# Patient Record
Sex: FEMALE | Race: BLACK OR AFRICAN AMERICAN | NOT HISPANIC OR LATINO | Employment: FULL TIME | RURAL
[De-identification: names, ages, dates, MRNs, and addresses within clinical notes are randomized per-mention and may not be internally consistent; named-entity substitution may affect disease eponyms.]

---

## 2021-08-19 ENCOUNTER — HOSPITAL ENCOUNTER (EMERGENCY)
Facility: HOSPITAL | Age: 41
Discharge: HOME | End: 2021-08-19
Attending: FAMILY MEDICINE
Payer: MEDICAID

## 2021-08-19 VITALS
HEIGHT: 64 IN | SYSTOLIC BLOOD PRESSURE: 146 MMHG | OXYGEN SATURATION: 95 % | RESPIRATION RATE: 19 BRPM | BODY MASS INDEX: 35.68 KG/M2 | DIASTOLIC BLOOD PRESSURE: 78 MMHG | HEART RATE: 125 BPM | WEIGHT: 209 LBS | TEMPERATURE: 103 F

## 2021-08-19 DIAGNOSIS — N30.00 ACUTE CYSTITIS WITHOUT HEMATURIA: ICD-10-CM

## 2021-08-19 DIAGNOSIS — U07.1 COVID-19: Primary | ICD-10-CM

## 2021-08-19 DIAGNOSIS — R50.9 FEVER, UNSPECIFIED FEVER CAUSE: ICD-10-CM

## 2021-08-19 DIAGNOSIS — R00.0 TACHYCARDIA: ICD-10-CM

## 2021-08-19 LAB
ALBUMIN SERPL BCP-MCNC: 2.6 G/DL (ref 3.5–5)
ALBUMIN/GLOB SERPL: 0.5 {RATIO}
ALP SERPL-CCNC: 68 U/L (ref 37–98)
ALT SERPL W P-5'-P-CCNC: 21 U/L (ref 13–56)
ANION GAP SERPL CALCULATED.3IONS-SCNC: 13 MMOL/L (ref 7–16)
AST SERPL W P-5'-P-CCNC: 18 U/L (ref 15–37)
BACTERIA #/AREA URNS HPF: ABNORMAL /HPF
BASOPHILS # BLD AUTO: 0.01 K/UL (ref 0–0.2)
BASOPHILS NFR BLD AUTO: 0.1 % (ref 0–1)
BILIRUB SERPL-MCNC: 0.2 MG/DL (ref 0–1.2)
BILIRUB UR QL STRIP: NEGATIVE
BUN SERPL-MCNC: 10 MG/DL (ref 7–18)
BUN/CREAT SERPL: 13 (ref 6–20)
CALCIUM SERPL-MCNC: 8.4 MG/DL (ref 8.5–10.1)
CHLORIDE SERPL-SCNC: 98 MMOL/L (ref 98–107)
CLARITY UR: ABNORMAL
CO2 SERPL-SCNC: 26 MMOL/L (ref 21–32)
COLOR UR: ABNORMAL
CREAT SERPL-MCNC: 0.8 MG/DL (ref 0.55–1.02)
DIFFERENTIAL METHOD BLD: ABNORMAL
EOSINOPHIL # BLD AUTO: 0 K/UL (ref 0–0.5)
EOSINOPHIL NFR BLD AUTO: 0 % (ref 1–4)
ERYTHROCYTE [DISTWIDTH] IN BLOOD BY AUTOMATED COUNT: 13.9 % (ref 11.5–14.5)
FLUAV AG UPPER RESP QL IA.RAPID: NEGATIVE
FLUBV AG UPPER RESP QL IA.RAPID: NEGATIVE
GLOBULIN SER-MCNC: 5.1 G/DL (ref 2–4)
GLUCOSE SERPL-MCNC: 124 MG/DL (ref 70–105)
GLUCOSE SERPL-MCNC: 131 MG/DL (ref 74–106)
GLUCOSE UR STRIP-MCNC: NEGATIVE MG/DL
HCT VFR BLD AUTO: 44 % (ref 38–47)
HGB BLD-MCNC: 13.7 G/DL (ref 12–16)
IMM GRANULOCYTES # BLD AUTO: 0.05 K/UL (ref 0–0.04)
IMM GRANULOCYTES NFR BLD: 0.5 % (ref 0–0.4)
KETONES UR STRIP-SCNC: NEGATIVE MG/DL
LEUKOCYTE ESTERASE UR QL STRIP: NEGATIVE
LYMPHOCYTES # BLD AUTO: 1.56 K/UL (ref 1–4.8)
LYMPHOCYTES NFR BLD AUTO: 16.4 % (ref 27–41)
MCH RBC QN AUTO: 25.1 PG (ref 27–31)
MCHC RBC AUTO-ENTMCNC: 31.1 G/DL (ref 32–36)
MCV RBC AUTO: 80.6 FL (ref 80–96)
MONOCYTES # BLD AUTO: 0.46 K/UL (ref 0–0.8)
MONOCYTES NFR BLD AUTO: 4.8 % (ref 2–6)
MPC BLD CALC-MCNC: 10.2 FL (ref 9.4–12.4)
MUCOUS THREADS #/AREA URNS HPF: ABNORMAL /HPF
NEUTROPHILS # BLD AUTO: 7.42 K/UL (ref 1.8–7.7)
NEUTROPHILS NFR BLD AUTO: 78.2 % (ref 53–65)
NITRITE UR QL STRIP: POSITIVE
NRBC # BLD AUTO: 0 X10E3/UL
NRBC, AUTO (.00): 0 %
PH UR STRIP: 5.5 PH UNITS
PLATELET # BLD AUTO: 267 K/UL (ref 150–400)
POTASSIUM SERPL-SCNC: 3.2 MMOL/L (ref 3.5–5.1)
PROT SERPL-MCNC: 7.7 G/DL (ref 6.4–8.2)
PROT UR QL STRIP: 100
RBC # BLD AUTO: 5.46 M/UL (ref 4.2–5.4)
RBC # UR STRIP: ABNORMAL /UL
RBC #/AREA URNS HPF: ABNORMAL /HPF
SARS-COV+SARS-COV-2 AG RESP QL IA.RAPID: POSITIVE
SODIUM SERPL-SCNC: 134 MMOL/L (ref 136–145)
SP GR UR STRIP: 1.02
SQUAMOUS #/AREA URNS LPF: ABNORMAL /LPF
TRICHOMONAS #/AREA URNS HPF: ABNORMAL /HPF
UROBILINOGEN UR STRIP-ACNC: 0.2 MG/DL
WBC # BLD AUTO: 9.5 K/UL (ref 4.5–11)
WBC #/AREA URNS HPF: ABNORMAL /HPF
YEAST #/AREA URNS HPF: ABNORMAL /HPF

## 2021-08-19 PROCEDURE — 99283 EMERGENCY DEPT VISIT LOW MDM: CPT | Mod: ,,, | Performed by: NURSE PRACTITIONER

## 2021-08-19 PROCEDURE — 36415 COLL VENOUS BLD VENIPUNCTURE: CPT | Performed by: NURSE PRACTITIONER

## 2021-08-19 PROCEDURE — 81003 URINALYSIS AUTO W/O SCOPE: CPT | Performed by: NURSE PRACTITIONER

## 2021-08-19 PROCEDURE — 85025 COMPLETE CBC W/AUTO DIFF WBC: CPT | Performed by: NURSE PRACTITIONER

## 2021-08-19 PROCEDURE — M0243 CASIRIVI AND IMDEVI INFUSION: HCPCS | Performed by: NURSE PRACTITIONER

## 2021-08-19 PROCEDURE — 87186 SC STD MICRODIL/AGAR DIL: CPT | Performed by: NURSE PRACTITIONER

## 2021-08-19 PROCEDURE — 25000003 PHARM REV CODE 250: Performed by: NURSE PRACTITIONER

## 2021-08-19 PROCEDURE — 96360 HYDRATION IV INFUSION INIT: CPT

## 2021-08-19 PROCEDURE — 87428 SARSCOV & INF VIR A&B AG IA: CPT | Performed by: NURSE PRACTITIONER

## 2021-08-19 PROCEDURE — 63600175 PHARM REV CODE 636 W HCPCS: Performed by: NURSE PRACTITIONER

## 2021-08-19 PROCEDURE — 81001 URINALYSIS AUTO W/SCOPE: CPT | Performed by: NURSE PRACTITIONER

## 2021-08-19 PROCEDURE — 96361 HYDRATE IV INFUSION ADD-ON: CPT

## 2021-08-19 PROCEDURE — 80053 COMPREHEN METABOLIC PANEL: CPT | Performed by: NURSE PRACTITIONER

## 2021-08-19 PROCEDURE — 99283 PR EMERGENCY DEPT VISIT,LEVEL III: ICD-10-PCS | Mod: ,,, | Performed by: NURSE PRACTITIONER

## 2021-08-19 PROCEDURE — 82962 GLUCOSE BLOOD TEST: CPT

## 2021-08-19 PROCEDURE — 87077 CULTURE AEROBIC IDENTIFY: CPT | Performed by: NURSE PRACTITIONER

## 2021-08-19 PROCEDURE — 99284 EMERGENCY DEPT VISIT MOD MDM: CPT | Mod: 25

## 2021-08-19 RX ORDER — POTASSIUM CHLORIDE 20 MEQ/1
40 TABLET, EXTENDED RELEASE ORAL ONCE
Status: COMPLETED | OUTPATIENT
Start: 2021-08-19 | End: 2021-08-19

## 2021-08-19 RX ORDER — CEPHALEXIN 500 MG/1
500 CAPSULE ORAL 4 TIMES DAILY
Qty: 20 CAPSULE | Refills: 0 | Status: SHIPPED | OUTPATIENT
Start: 2021-08-19 | End: 2021-08-24

## 2021-08-19 RX ORDER — ACETAMINOPHEN 500 MG
1000 TABLET ORAL
Status: COMPLETED | OUTPATIENT
Start: 2021-08-19 | End: 2021-08-19

## 2021-08-19 RX ORDER — INSULIN GLARGINE 100 [IU]/ML
INJECTION, SOLUTION SUBCUTANEOUS NIGHTLY
COMMUNITY

## 2021-08-19 RX ORDER — LISINOPRIL 10 MG/1
10 TABLET ORAL DAILY
COMMUNITY

## 2021-08-19 RX ADMIN — POTASSIUM CHLORIDE 40 MEQ: 1500 TABLET, EXTENDED RELEASE ORAL at 07:08

## 2021-08-19 RX ADMIN — SODIUM CHLORIDE 1000 ML: 9 INJECTION, SOLUTION INTRAVENOUS at 05:08

## 2021-08-19 RX ADMIN — CASIRIVIMAB AND IMDEVIMAB 600 MG: 600; 600 INJECTION, SOLUTION, CONCENTRATE INTRAVENOUS at 06:08

## 2021-08-19 RX ADMIN — ACETAMINOPHEN 1000 MG: 500 TABLET ORAL at 09:08

## 2021-08-22 LAB — UA COMPLETE W REFLEX CULTURE PNL UR: ABNORMAL

## 2022-08-19 ENCOUNTER — HOSPITAL ENCOUNTER (EMERGENCY)
Facility: HOSPITAL | Age: 42
Discharge: HOME | End: 2022-08-19
Payer: MEDICAID

## 2022-08-19 VITALS
BODY MASS INDEX: 35.85 KG/M2 | TEMPERATURE: 98 F | HEART RATE: 94 BPM | OXYGEN SATURATION: 97 % | WEIGHT: 210 LBS | RESPIRATION RATE: 20 BRPM | SYSTOLIC BLOOD PRESSURE: 171 MMHG | HEIGHT: 64 IN | DIASTOLIC BLOOD PRESSURE: 81 MMHG

## 2022-08-19 DIAGNOSIS — T50.Z95A ADVERSE EFFECT OF VACCINE, INITIAL ENCOUNTER: Primary | ICD-10-CM

## 2022-08-19 LAB
FLUAV AG UPPER RESP QL IA.RAPID: NEGATIVE
FLUBV AG UPPER RESP QL IA.RAPID: NEGATIVE
SARS-COV+SARS-COV-2 AG RESP QL IA.RAPID: NEGATIVE

## 2022-08-19 PROCEDURE — 99283 PR EMERGENCY DEPT VISIT,LEVEL III: ICD-10-PCS | Mod: ,,, | Performed by: NURSE PRACTITIONER

## 2022-08-19 PROCEDURE — 99283 EMERGENCY DEPT VISIT LOW MDM: CPT

## 2022-08-19 PROCEDURE — 99283 EMERGENCY DEPT VISIT LOW MDM: CPT | Mod: ,,, | Performed by: NURSE PRACTITIONER

## 2022-08-19 PROCEDURE — 87428 SARSCOV & INF VIR A&B AG IA: CPT | Performed by: NURSE PRACTITIONER

## 2022-08-19 NOTE — DISCHARGE INSTRUCTIONS
Rotate tylenol and ibuprofen as needed for fever. Follow up with your primary care provider in 2 days. Return to the emergency department for any increase in symptoms or for any other new or worrisome symptoms.

## 2022-08-19 NOTE — Clinical Note
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  She may return to work on 08/21/2022.       If you have any questions or concerns, please don't hesitate to call.      katherine banuelos RN    "

## 2022-08-19 NOTE — ED PROVIDER NOTES
Encounter Date: 2022       History     Chief Complaint   Patient presents with    Fever    Sore Throat     41 year old female presents to the emergency department to be evaluated for ear ache, sore throat and fever that began 3 days ago. She received a flu shot 4 days ago.     The history is provided by the patient.   Fever  Primary symptoms of the febrile illness include fever and fatigue. Primary symptoms do not include visual change, headaches, cough, wheezing, shortness of breath, abdominal pain, nausea, vomiting, diarrhea, dysuria, altered mental status, myalgias, arthralgias or rash.   The maximum temperature recorded prior to her arrival was 102 to 102.9 F.   Sore Throat   Pertinent negatives include no abdominal pain, coughing, diarrhea, headaches, shortness of breath or vomiting.     Review of patient's allergies indicates:  No Known Allergies  Past Medical History:   Diagnosis Date    Diabetes mellitus      Past Surgical History:   Procedure Laterality Date     SECTION      CHOLECYSTECTOMY       No family history on file.  Social History     Tobacco Use    Smoking status: Never Smoker    Smokeless tobacco: Never Used   Substance Use Topics    Alcohol use: Not Currently    Drug use: Never     Review of Systems   Constitutional: Positive for fatigue and fever.   HENT: Positive for sore throat.    Respiratory: Negative for cough, shortness of breath and wheezing.    Gastrointestinal: Negative for abdominal pain, diarrhea, nausea and vomiting.   Genitourinary: Negative for dysuria.   Musculoskeletal: Negative for arthralgias and myalgias.   Skin: Negative for rash.   Neurological: Negative for headaches.   All other systems reviewed and are negative.      Physical Exam     Initial Vitals [22 1212]   BP Pulse Resp Temp SpO2   (!) 171/81 94 20 98.3 °F (36.8 °C) 97 %      MAP       --         Physical Exam    Vitals reviewed.  Constitutional: She appears well-developed and  well-nourished.   HENT:   Right Ear: Tympanic membrane normal.   Left Ear: Tympanic membrane normal.   Mouth/Throat: Oropharynx is clear and moist.   Neck: Neck supple.   Cardiovascular: Normal rate and regular rhythm.   Pulmonary/Chest: Breath sounds normal.   Abdominal: Abdomen is soft. Bowel sounds are normal. She exhibits no distension and no mass. There is no abdominal tenderness. There is no rebound and no guarding.   Musculoskeletal:         General: Normal range of motion.      Cervical back: Neck supple.     Neurological: She is alert and oriented to person, place, and time. She has normal strength. GCS score is 15. GCS eye subscore is 4. GCS verbal subscore is 5. GCS motor subscore is 6.   Skin: Skin is warm and dry. Capillary refill takes less than 2 seconds.   Psychiatric: She has a normal mood and affect.         Medical Screening Exam   See Full Note    ED Course   Procedures  Labs Reviewed   SARS-COV2 (COVID) W/ FLU ANTIGEN - Normal    Narrative:     Negative SARS-CoV results should not be used as the sole basis for treatment or patient management decisions; negative results should be considered in the context of a patient's recent exposures, history and the presene of clinical signs and symptoms consistent with COVID-19.  Negative results should be treated as presumptive and confirmed by molecular assay, if necessary for patient management.          Imaging Results    None          Medications - No data to display                    Clinical Impression:   Final diagnoses:  [T50.Z95A] Adverse effect of vaccine, initial encounter (Primary)          ED Disposition Condition    Discharge Stable        ED Prescriptions     None        Follow-up Information    None          Yuliya Rose, MALINDA  08/19/22 [id number]

## 2024-04-24 ENCOUNTER — OFFICE VISIT (OUTPATIENT)
Dept: FAMILY MEDICINE | Facility: CLINIC | Age: 44
End: 2024-04-24
Payer: COMMERCIAL

## 2024-04-24 VITALS
SYSTOLIC BLOOD PRESSURE: 174 MMHG | HEIGHT: 64 IN | HEART RATE: 100 BPM | TEMPERATURE: 97 F | RESPIRATION RATE: 18 BRPM | WEIGHT: 215 LBS | DIASTOLIC BLOOD PRESSURE: 93 MMHG | OXYGEN SATURATION: 98 % | BODY MASS INDEX: 36.7 KG/M2

## 2024-04-24 DIAGNOSIS — E11.9 TYPE 2 DIABETES MELLITUS WITHOUT COMPLICATION, UNSPECIFIED WHETHER LONG TERM INSULIN USE: Primary | ICD-10-CM

## 2024-04-24 DIAGNOSIS — Z12.39 ENCOUNTER FOR SCREENING FOR MALIGNANT NEOPLASM OF BREAST, UNSPECIFIED SCREENING MODALITY: ICD-10-CM

## 2024-04-24 DIAGNOSIS — J30.9 ALLERGIC RHINITIS, UNSPECIFIED SEASONALITY, UNSPECIFIED TRIGGER: ICD-10-CM

## 2024-04-24 DIAGNOSIS — I10 HYPERTENSION, UNSPECIFIED TYPE: ICD-10-CM

## 2024-04-24 DIAGNOSIS — Z12.4 PAP SMEAR FOR CERVICAL CANCER SCREENING: ICD-10-CM

## 2024-04-24 LAB
ANION GAP SERPL CALCULATED.3IONS-SCNC: 10 MMOL/L (ref 7–16)
BUN SERPL-MCNC: 14 MG/DL (ref 7–18)
BUN/CREAT SERPL: 18 (ref 6–20)
CALCIUM SERPL-MCNC: 9.3 MG/DL (ref 8.5–10.1)
CHLORIDE SERPL-SCNC: 106 MMOL/L (ref 98–107)
CHOLEST SERPL-MCNC: 196 MG/DL (ref 0–200)
CHOLEST/HDLC SERPL: 4.8 {RATIO}
CO2 SERPL-SCNC: 25 MMOL/L (ref 21–32)
CREAT SERPL-MCNC: 0.8 MG/DL (ref 0.55–1.02)
EGFR (NO RACE VARIABLE) (RUSH/TITUS): 93 ML/MIN/1.73M2
EST. AVERAGE GLUCOSE BLD GHB EST-MCNC: 332 MG/DL
GLUCOSE SERPL-MCNC: 384 MG/DL (ref 74–106)
HBA1C MFR BLD HPLC: 13.2 % (ref 4.5–6.6)
HDLC SERPL-MCNC: 41 MG/DL (ref 40–60)
LDLC SERPL CALC-MCNC: 131 MG/DL
LDLC/HDLC SERPL: 3.2 {RATIO}
NONHDLC SERPL-MCNC: 155 MG/DL
POTASSIUM SERPL-SCNC: 4.1 MMOL/L (ref 3.5–5.1)
SODIUM SERPL-SCNC: 137 MMOL/L (ref 136–145)
TRIGL SERPL-MCNC: 119 MG/DL (ref 35–150)
VLDLC SERPL-MCNC: 24 MG/DL

## 2024-04-24 PROCEDURE — 99214 OFFICE O/P EST MOD 30 MIN: CPT | Mod: ,,, | Performed by: INTERNAL MEDICINE

## 2024-04-24 PROCEDURE — 80048 BASIC METABOLIC PNL TOTAL CA: CPT | Mod: ,,, | Performed by: CLINICAL MEDICAL LABORATORY

## 2024-04-24 PROCEDURE — 1159F MED LIST DOCD IN RCRD: CPT | Mod: ,,, | Performed by: INTERNAL MEDICINE

## 2024-04-24 PROCEDURE — 80061 LIPID PANEL: CPT | Mod: ,,, | Performed by: CLINICAL MEDICAL LABORATORY

## 2024-04-24 PROCEDURE — 83036 HEMOGLOBIN GLYCOSYLATED A1C: CPT | Mod: ,,, | Performed by: CLINICAL MEDICAL LABORATORY

## 2024-04-24 PROCEDURE — 3080F DIAST BP >= 90 MM HG: CPT | Mod: ,,, | Performed by: INTERNAL MEDICINE

## 2024-04-24 PROCEDURE — 3077F SYST BP >= 140 MM HG: CPT | Mod: ,,, | Performed by: INTERNAL MEDICINE

## 2024-04-24 PROCEDURE — 4010F ACE/ARB THERAPY RXD/TAKEN: CPT | Mod: ,,, | Performed by: INTERNAL MEDICINE

## 2024-04-24 PROCEDURE — 3008F BODY MASS INDEX DOCD: CPT | Mod: ,,, | Performed by: INTERNAL MEDICINE

## 2024-04-24 PROCEDURE — 3046F HEMOGLOBIN A1C LEVEL >9.0%: CPT | Mod: ,,, | Performed by: INTERNAL MEDICINE

## 2024-04-24 RX ORDER — ONDANSETRON 4 MG/1
4 TABLET, FILM COATED ORAL
COMMUNITY
Start: 2024-02-02 | End: 2024-04-24 | Stop reason: SDUPTHER

## 2024-04-24 RX ORDER — METFORMIN HYDROCHLORIDE 1000 MG/1
1000 TABLET ORAL 2 TIMES DAILY
Qty: 90 TABLET | Refills: 1 | Status: SHIPPED | OUTPATIENT
Start: 2024-04-24

## 2024-04-24 RX ORDER — BLOOD SUGAR DIAGNOSTIC
STRIP MISCELLANEOUS
COMMUNITY
Start: 2024-01-10 | End: 2024-04-24 | Stop reason: SDUPTHER

## 2024-04-24 RX ORDER — INSULIN GLARGINE 100 [IU]/ML
100 INJECTION, SOLUTION SUBCUTANEOUS NIGHTLY
Qty: 10 ML | Refills: 2 | Status: SHIPPED | OUTPATIENT
Start: 2024-04-24

## 2024-04-24 RX ORDER — METFORMIN HYDROCHLORIDE 1000 MG/1
1000 TABLET ORAL 2 TIMES DAILY
COMMUNITY
Start: 2024-01-10 | End: 2024-04-24 | Stop reason: SDUPTHER

## 2024-04-24 RX ORDER — SULFAMETHOXAZOLE AND TRIMETHOPRIM 800; 160 MG/1; MG/1
1 TABLET ORAL 2 TIMES DAILY
Qty: 20 TABLET | Refills: 0 | Status: SHIPPED | OUTPATIENT
Start: 2024-04-24

## 2024-04-24 RX ORDER — BLOOD SUGAR DIAGNOSTIC
1 STRIP MISCELLANEOUS 2 TIMES DAILY
Qty: 200 EACH | Refills: 2 | Status: SHIPPED | OUTPATIENT
Start: 2024-04-24

## 2024-04-24 RX ORDER — LISINOPRIL 20 MG/1
20 TABLET ORAL DAILY
Qty: 90 TABLET | Refills: 1 | Status: SHIPPED | OUTPATIENT
Start: 2024-04-24

## 2024-04-24 RX ORDER — ONDANSETRON 4 MG/1
4 TABLET, FILM COATED ORAL DAILY PRN
Qty: 30 TABLET | Refills: 0 | Status: SHIPPED | OUTPATIENT
Start: 2024-04-24

## 2024-04-24 RX ORDER — BLOOD-GLUCOSE METER
EACH MISCELLANEOUS
COMMUNITY
Start: 2024-01-10

## 2024-04-24 RX ORDER — LISINOPRIL 10 MG/1
10 TABLET ORAL DAILY
Status: CANCELLED | OUTPATIENT
Start: 2024-04-24

## 2024-04-24 RX ORDER — FLUCONAZOLE 200 MG/1
200 TABLET ORAL DAILY
Qty: 5 TABLET | Refills: 2 | Status: SHIPPED | OUTPATIENT
Start: 2024-04-24

## 2024-04-25 ENCOUNTER — TELEPHONE (OUTPATIENT)
Dept: FAMILY MEDICINE | Facility: CLINIC | Age: 44
End: 2024-04-25
Payer: COMMERCIAL

## 2024-04-25 DIAGNOSIS — Z12.11 COLON CANCER SCREENING: Primary | ICD-10-CM

## 2024-04-25 RX ORDER — POLYETHYLENE GLYCOL 3350, SODIUM SULFATE ANHYDROUS, SODIUM BICARBONATE, SODIUM CHLORIDE, POTASSIUM CHLORIDE 236; 22.74; 6.74; 5.86; 2.97 G/4L; G/4L; G/4L; G/4L; G/4L
4 POWDER, FOR SOLUTION ORAL ONCE
Qty: 4000 ML | Refills: 0 | Status: SHIPPED | OUTPATIENT
Start: 2024-04-25 | End: 2024-04-25

## 2024-04-25 NOTE — TELEPHONE ENCOUNTER
----- Message from Myles Thakkar MD sent at 4/25/2024 12:35 PM CDT -----  Need to see in   2  week please  abnl results     1341 Pt is scheduled to come in on 05/09/24

## 2024-04-29 PROBLEM — Z12.11 ENCOUNTER FOR SCREENING COLONOSCOPY: Status: ACTIVE | Noted: 2024-04-29

## 2024-04-29 PROBLEM — Z12.39 ENCOUNTER FOR SCREENING FOR MALIGNANT NEOPLASM OF BREAST: Status: ACTIVE | Noted: 2024-04-29

## 2024-04-29 PROBLEM — Z12.4 PAP SMEAR FOR CERVICAL CANCER SCREENING: Status: ACTIVE | Noted: 2024-04-29

## 2024-04-29 PROBLEM — E11.9 TYPE 2 DIABETES MELLITUS WITHOUT COMPLICATION: Status: ACTIVE | Noted: 2024-04-29

## 2024-04-29 NOTE — PROGRESS NOTES
"Subjective:       Patient ID: Cathryn North is a 43 y.o. female.    Chief Complaint: Establish Care and Medication Refill    HPI  .  Patient here to establish care.  Blood sugar has been elevated as high as 485 patient has chronic poorly controlled diabetes chronic hypertension patient also has evidence of vaginal pain vaginal area was examined presents a female nurse there is a.  Zainab  Vaginal abscess identified.    Current Medications:    Current Outpatient Medications:     ACCU-CHEK GUIDE GLUCOSE METER MiS[place]o, use as directed, Disp: , Rfl:     lisinopriL 10 MG tablet, Take 10 mg by mouth once daily., Disp: , Rfl:     ACCU-CHEK GUIDE TEST STRIPS Strp, 1 strip by Misc.(Non-Drug; Combo Route) route 2 (two) times a day., Disp: 200 each, Rfl: 2    fluconazole (DIFLUCAN) 200 MG Tab, Take 1 tablet (200 mg total) by mouth once daily., Disp: 5 tablet, Rfl: 2    insulin glargine U-100, Lantus, (LANTUS U-100 INSULIN) 100 unit/mL injection, Inject 100 Units into the skin every evening., Disp: 10 mL, Rfl: 2    lisinopriL (PRINIVIL,ZESTRIL) 20 MG tablet, Take 1 tablet (20 mg total) by mouth once daily., Disp: 90 tablet, Rfl: 1    metFORMIN (GLUCOPHAGE) 1000 MG tablet, Take 1 tablet (1,000 mg total) by mouth 2 (two) times daily., Disp: 90 tablet, Rfl: 1    ondansetron (ZOFRAN) 4 MG tablet, Take 1 tablet (4 mg total) by mouth daily as needed for Nausea., Disp: 30 tablet, Rfl: 0    sulfamethoxazole-trimethoprim 800-160mg (BACTRIM DS) 800-160 mg Tab, Take 1 tablet by mouth 2 (two) times daily., Disp: 20 tablet, Rfl: 0           ROS  Twelve point system reviewed, unremarkable except for stated above in HPI.        Objective:         Vitals:    04/24/24 1604 04/24/24 1625   BP: (!) 173/96 (!) 174/93   BP Location: Left arm    Patient Position: Sitting    BP Method: Large (Automatic)    Pulse: 100    Resp: 18    Temp: 97.1 °F (36.2 °C)    TempSrc: Temporal    SpO2: 98%    Weight: 97.5 kg (215 lb)    Height: 5' 4" (1.626 m)     "     Physical Exam     Patient is awake alert oriented person place and  Lungs are clear to auscultation bilaterally no crackles or wheezes   Cardiovascular S1-S2 regular rate and rhythm no murmurs rubs or gallops   Abdomen is soft positive bowel sounds nontender, extremities no clubbing cyanosis edema  Neuro no focal neurological deficits  Skin warm and dry.     Last Labs:     Office Visit on 04/24/2024   Component Date Value    Sodium 04/24/2024 137     Potassium 04/24/2024 4.1     Chloride 04/24/2024 106     CO2 04/24/2024 25     Anion Gap 04/24/2024 10     Glucose 04/24/2024 384 (H)     BUN 04/24/2024 14     Creatinine 04/24/2024 0.80     BUN/Creatinine Ratio 04/24/2024 18     Calcium 04/24/2024 9.3     eGFR 04/24/2024 93     Hemoglobin A1C 04/24/2024 13.2 (H)     Estimated Average Glucose 04/24/2024 332     Triglycerides 04/24/2024 119     Cholesterol 04/24/2024 196     HDL Cholesterol 04/24/2024 41     Cholesterol/HDL Ratio (R* 04/24/2024 4.8     Non-HDL 04/24/2024 155     LDL Calculated 04/24/2024 131     LDL/HDL 04/24/2024 3.2     VLDL 04/24/2024 24        Last Imaging:  No image results found.         **Labs and x-rays personally reviewed by me    ** reviewed           Assessment & Plan:       1. Type 2 diabetes mellitus without complication, unspecified whether long term insulin use  -     Basic Metabolic Panel; Future; Expected date: 04/24/2024  -     Hemoglobin A1C; Future; Expected date: 04/24/2024  -     Lipid Panel; Future; Expected date: 04/24/2024    -     Mammo Digital Screening Bilat w/ Norman; Future; Expected date: 04/24/2024      -     Colonoscopy; Future; Expected date: 04/24/2024      -     Ambulatory referral/consult to Obstetrics / Gynecology; Future; Expected date: 05/01/2  Pap smear    -     Ambulatory referral/consult to General Surgery; Future; Expected date: 05/01/2024  Perivaginal abscess  Other orders  -     ACCU-CHEK GUIDE TEST STRIPS Strp; 1 strip by Misc.(Non-Drug; Combo Route)  route 2 (two) times a day.  Dispense: 200 each; Refill: 2  -     insulin glargine U-100, Lantus, (LANTUS U-100 INSULIN) 100 unit/mL injection; Inject 100 Units into the skin every evening.  Dispense: 10 mL; Refill: 2  -     metFORMIN (GLUCOPHAGE) 1000 MG tablet; Take 1 tablet (1,000 mg total) by mouth 2 (two) times daily.  Dispense: 90 tablet; Refill: 1  -     ondansetron (ZOFRAN) 4 MG tablet; Take 1 tablet (4 mg total) by mouth daily as needed for Nausea.  Dispense: 30 tablet; Refill: 0  -     lisinopriL (PRINIVIL,ZESTRIL) 20 MG tablet; Take 1 tablet (20 mg total) by mouth once daily.  Dispense: 90 tablet; Refill: 1  -     fluconazole (DIFLUCAN) 200 MG Tab; Take 1 tablet (200 mg total) by mouth once daily.  Dispense: 5 tablet; Refill: 2  -     sulfamethoxazole-trimethoprim 800-160mg (BACTRIM DS) 800-160 mg Tab; Take 1 tablet by mouth 2 (two) times daily.  Dispense: 20 tablet; Refill: 0            Myles Thakkar MD

## 2024-11-21 ENCOUNTER — HOSPITAL ENCOUNTER (INPATIENT)
Facility: HOSPITAL | Age: 44
LOS: 2 days | Discharge: HOME | DRG: 282 | End: 2024-11-23
Attending: EMERGENCY MEDICINE | Admitting: INTERNAL MEDICINE
Payer: COMMERCIAL

## 2024-11-21 DIAGNOSIS — Z82.49 FAMILY HISTORY OF PREMATURE CAD: ICD-10-CM

## 2024-11-21 DIAGNOSIS — I21.9 ACUTE MYOCARDIAL INFARCTION, UNSPECIFIED MI TYPE, UNSPECIFIED ARTERY: ICD-10-CM

## 2024-11-21 DIAGNOSIS — Z79.4 TYPE 2 DIABETES MELLITUS WITHOUT COMPLICATION, WITH LONG-TERM CURRENT USE OF INSULIN: ICD-10-CM

## 2024-11-21 DIAGNOSIS — I21.4 NSTEMI (NON-ST ELEVATED MYOCARDIAL INFARCTION): Primary | ICD-10-CM

## 2024-11-21 DIAGNOSIS — R07.9 CHEST PAIN: ICD-10-CM

## 2024-11-21 DIAGNOSIS — E11.9 TYPE 2 DIABETES MELLITUS WITHOUT COMPLICATION, WITH LONG-TERM CURRENT USE OF INSULIN: ICD-10-CM

## 2024-11-21 LAB
ALBUMIN SERPL BCP-MCNC: 2.5 G/DL (ref 3.5–5)
ALBUMIN/GLOB SERPL: 0.6 {RATIO}
ALP SERPL-CCNC: 70 U/L (ref 40–150)
ALT SERPL W P-5'-P-CCNC: 9 U/L
ANION GAP SERPL CALCULATED.3IONS-SCNC: 11 MMOL/L (ref 7–16)
APTT PPP: 27.8 SECONDS (ref 25.2–37.3)
AST SERPL W P-5'-P-CCNC: 34 U/L (ref 5–34)
BASOPHILS # BLD AUTO: 0.04 K/UL (ref 0–0.2)
BASOPHILS NFR BLD AUTO: 0.4 % (ref 0–1)
BILIRUB SERPL-MCNC: 0.1 MG/DL
BUN SERPL-MCNC: 13 MG/DL (ref 7–19)
BUN/CREAT SERPL: 17 (ref 6–20)
CALCIUM SERPL-MCNC: 8.6 MG/DL (ref 8.4–10.2)
CHLORIDE SERPL-SCNC: 104 MMOL/L (ref 98–107)
CHOLEST SERPL-MCNC: 173 MG/DL
CHOLEST/HDLC SERPL: 4.4 {RATIO}
CO2 SERPL-SCNC: 25 MMOL/L (ref 22–29)
CREAT SERPL-MCNC: 0.77 MG/DL (ref 0.55–1.02)
DIFFERENTIAL METHOD BLD: ABNORMAL
EGFR (NO RACE VARIABLE) (RUSH/TITUS): 97 ML/MIN/1.73M2
EOSINOPHIL # BLD AUTO: 0.05 K/UL (ref 0–0.5)
EOSINOPHIL NFR BLD AUTO: 0.5 % (ref 1–4)
ERYTHROCYTE [DISTWIDTH] IN BLOOD BY AUTOMATED COUNT: 14.1 % (ref 11.5–14.5)
EST. AVERAGE GLUCOSE BLD GHB EST-MCNC: 346 MG/DL
GLOBULIN SER-MCNC: 4.3 G/DL (ref 2–4)
GLUCOSE SERPL-MCNC: 139 MG/DL (ref 70–105)
GLUCOSE SERPL-MCNC: 150 MG/DL (ref 70–105)
GLUCOSE SERPL-MCNC: 237 MG/DL (ref 70–105)
GLUCOSE SERPL-MCNC: 248 MG/DL (ref 74–100)
HBA1C MFR BLD HPLC: 13.7 %
HCG SERUM QUALITATIVE: NEGATIVE
HCT VFR BLD AUTO: 35.5 % (ref 38–47)
HDLC SERPL-MCNC: 39 MG/DL (ref 35–60)
HGB BLD-MCNC: 10.7 G/DL (ref 12–16)
IMM GRANULOCYTES # BLD AUTO: 0.04 K/UL (ref 0–0.04)
IMM GRANULOCYTES NFR BLD: 0.4 % (ref 0–0.4)
INDIRECT COOMBS: NORMAL
INR BLD: 1.03
LDLC SERPL CALC-MCNC: 109 MG/DL
LDLC/HDLC SERPL: 2.8 {RATIO}
LYMPHOCYTES # BLD AUTO: 2.39 K/UL (ref 1–4.8)
LYMPHOCYTES NFR BLD AUTO: 24.1 % (ref 27–41)
MAGNESIUM SERPL-MCNC: 1.9 MG/DL (ref 1.6–2.6)
MCH RBC QN AUTO: 25.2 PG (ref 27–31)
MCHC RBC AUTO-ENTMCNC: 30.1 G/DL (ref 32–36)
MCV RBC AUTO: 83.5 FL (ref 80–96)
MONOCYTES # BLD AUTO: 0.52 K/UL (ref 0–0.8)
MONOCYTES NFR BLD AUTO: 5.2 % (ref 2–6)
MPC BLD CALC-MCNC: 10.5 FL (ref 9.4–12.4)
NEUTROPHILS # BLD AUTO: 6.88 K/UL (ref 1.8–7.7)
NEUTROPHILS NFR BLD AUTO: 69.4 % (ref 53–65)
NONHDLC SERPL-MCNC: 134 MG/DL
NRBC # BLD AUTO: 0 X10E3/UL
NRBC, AUTO (.00): 0 %
NT-PROBNP SERPL-MCNC: 58 PG/ML (ref 1–125)
PLATELET # BLD AUTO: 398 K/UL (ref 150–400)
POTASSIUM SERPL-SCNC: 3.5 MMOL/L (ref 3.5–5.1)
PROT SERPL-MCNC: 6.8 G/DL (ref 6.4–8.3)
PROTHROMBIN TIME: 13.4 SECONDS (ref 11.7–14.7)
RBC # BLD AUTO: 4.25 M/UL (ref 4.2–5.4)
RH BLD: NORMAL
SODIUM SERPL-SCNC: 136 MMOL/L (ref 136–145)
SPECIMEN OUTDATE: NORMAL
TRIGL SERPL-MCNC: 125 MG/DL (ref 37–140)
TROPONIN I SERPL HS-MCNC: 23.2 NG/L
TROPONIN I SERPL HS-MCNC: 27.4 NG/L
VLDLC SERPL-MCNC: 25 MG/DL
WBC # BLD AUTO: 9.92 K/UL (ref 4.5–11)

## 2024-11-21 PROCEDURE — 85025 COMPLETE CBC W/AUTO DIFF WBC: CPT | Performed by: EMERGENCY MEDICINE

## 2024-11-21 PROCEDURE — 85610 PROTHROMBIN TIME: CPT | Performed by: EMERGENCY MEDICINE

## 2024-11-21 PROCEDURE — 85730 THROMBOPLASTIN TIME PARTIAL: CPT | Performed by: EMERGENCY MEDICINE

## 2024-11-21 PROCEDURE — 80061 LIPID PANEL: CPT | Performed by: INTERNAL MEDICINE

## 2024-11-21 PROCEDURE — 11000001 HC ACUTE MED/SURG PRIVATE ROOM

## 2024-11-21 PROCEDURE — 82962 GLUCOSE BLOOD TEST: CPT

## 2024-11-21 PROCEDURE — 80053 COMPREHEN METABOLIC PANEL: CPT | Performed by: EMERGENCY MEDICINE

## 2024-11-21 PROCEDURE — 99285 EMERGENCY DEPT VISIT HI MDM: CPT | Mod: 25

## 2024-11-21 PROCEDURE — 36415 COLL VENOUS BLD VENIPUNCTURE: CPT | Performed by: EMERGENCY MEDICINE

## 2024-11-21 PROCEDURE — 86901 BLOOD TYPING SEROLOGIC RH(D): CPT | Performed by: INTERNAL MEDICINE

## 2024-11-21 PROCEDURE — 93010 ELECTROCARDIOGRAM REPORT: CPT | Mod: ,,, | Performed by: INTERNAL MEDICINE

## 2024-11-21 PROCEDURE — 63600175 PHARM REV CODE 636 W HCPCS: Performed by: INTERNAL MEDICINE

## 2024-11-21 PROCEDURE — 83036 HEMOGLOBIN GLYCOSYLATED A1C: CPT | Performed by: INTERNAL MEDICINE

## 2024-11-21 PROCEDURE — 84703 CHORIONIC GONADOTROPIN ASSAY: CPT | Performed by: INTERNAL MEDICINE

## 2024-11-21 PROCEDURE — 84484 ASSAY OF TROPONIN QUANT: CPT | Performed by: INTERNAL MEDICINE

## 2024-11-21 PROCEDURE — 36415 COLL VENOUS BLD VENIPUNCTURE: CPT | Performed by: INTERNAL MEDICINE

## 2024-11-21 PROCEDURE — 96374 THER/PROPH/DIAG INJ IV PUSH: CPT

## 2024-11-21 PROCEDURE — 93005 ELECTROCARDIOGRAM TRACING: CPT

## 2024-11-21 PROCEDURE — 25000003 PHARM REV CODE 250: Performed by: INTERNAL MEDICINE

## 2024-11-21 PROCEDURE — 84484 ASSAY OF TROPONIN QUANT: CPT | Performed by: EMERGENCY MEDICINE

## 2024-11-21 PROCEDURE — 83880 ASSAY OF NATRIURETIC PEPTIDE: CPT | Performed by: EMERGENCY MEDICINE

## 2024-11-21 PROCEDURE — 99223 1ST HOSP IP/OBS HIGH 75: CPT | Mod: ,,, | Performed by: INTERNAL MEDICINE

## 2024-11-21 PROCEDURE — 83735 ASSAY OF MAGNESIUM: CPT | Performed by: EMERGENCY MEDICINE

## 2024-11-21 RX ORDER — HEPARIN SODIUM,PORCINE/D5W 25000/250
0-40 INTRAVENOUS SOLUTION INTRAVENOUS CONTINUOUS
Status: DISCONTINUED | OUTPATIENT
Start: 2024-11-21 | End: 2024-11-22

## 2024-11-21 RX ORDER — ASPIRIN 325 MG
325 TABLET ORAL ONCE
Status: COMPLETED | OUTPATIENT
Start: 2024-11-21 | End: 2024-11-21

## 2024-11-21 RX ORDER — INSULIN ASPART 100 [IU]/ML
0-10 INJECTION, SOLUTION INTRAVENOUS; SUBCUTANEOUS EVERY 6 HOURS PRN
Status: DISCONTINUED | OUTPATIENT
Start: 2024-11-21 | End: 2024-11-23 | Stop reason: HOSPADM

## 2024-11-21 RX ORDER — GLUCAGON 1 MG
1 KIT INJECTION
Status: DISCONTINUED | OUTPATIENT
Start: 2024-11-21 | End: 2024-11-23 | Stop reason: HOSPADM

## 2024-11-21 RX ORDER — SODIUM CHLORIDE 0.9 % (FLUSH) 0.9 %
10 SYRINGE (ML) INJECTION
Status: DISCONTINUED | OUTPATIENT
Start: 2024-11-21 | End: 2024-11-23 | Stop reason: HOSPADM

## 2024-11-21 RX ORDER — ONDANSETRON HYDROCHLORIDE 2 MG/ML
4 INJECTION, SOLUTION INTRAVENOUS EVERY 8 HOURS PRN
Status: DISCONTINUED | OUTPATIENT
Start: 2024-11-21 | End: 2024-11-22

## 2024-11-21 RX ORDER — LISINOPRIL 5 MG/1
5 TABLET ORAL DAILY
Status: DISCONTINUED | OUTPATIENT
Start: 2024-11-22 | End: 2024-11-23 | Stop reason: HOSPADM

## 2024-11-21 RX ORDER — ATORVASTATIN CALCIUM 40 MG/1
80 TABLET, FILM COATED ORAL DAILY
Status: DISCONTINUED | OUTPATIENT
Start: 2024-11-22 | End: 2024-11-23 | Stop reason: HOSPADM

## 2024-11-21 RX ORDER — ASPIRIN 81 MG/1
81 TABLET ORAL DAILY
Status: DISCONTINUED | OUTPATIENT
Start: 2024-11-22 | End: 2024-11-23 | Stop reason: HOSPADM

## 2024-11-21 RX ORDER — NITROGLYCERIN 0.4 MG/1
0.4 TABLET SUBLINGUAL EVERY 5 MIN PRN
Status: DISCONTINUED | OUTPATIENT
Start: 2024-11-21 | End: 2024-11-23 | Stop reason: HOSPADM

## 2024-11-21 RX ORDER — INSULIN GLARGINE 100 [IU]/ML
60 INJECTION, SOLUTION SUBCUTANEOUS DAILY
Status: DISCONTINUED | OUTPATIENT
Start: 2024-11-22 | End: 2024-11-23 | Stop reason: HOSPADM

## 2024-11-21 RX ORDER — METOPROLOL TARTRATE 25 MG/1
12.5 TABLET ORAL 2 TIMES DAILY
Status: DISCONTINUED | OUTPATIENT
Start: 2024-11-21 | End: 2024-11-23 | Stop reason: HOSPADM

## 2024-11-21 RX ADMIN — METOPROLOL TARTRATE 12.5 MG: 25 TABLET, FILM COATED ORAL at 11:11

## 2024-11-21 RX ADMIN — HEPARIN SODIUM 12 UNITS/KG/HR: 10000 INJECTION, SOLUTION INTRAVENOUS at 10:11

## 2024-11-21 RX ADMIN — ASPIRIN 325 MG: 325 TABLET ORAL at 11:11

## 2024-11-21 NOTE — Clinical Note
Pt transferred back to room 648. Bedside report given to YANETH Nina. Pt denies pain. Right groin site soft CDI. No hematoma or bleeding noted. 2 plus pulse noted to right DP.

## 2024-11-21 NOTE — ED PROVIDER NOTES
Encounter Date: 2024    SCRIBE #1 NOTE: I, Ana Yonathan, am scribing for, and in the presence of,  Duran Reardon MD.       History     Chief Complaint   Patient presents with    Chest Pain     Presents POV complaining of chest pain that onset this morning. No cardiac hx. States that she has also been having a hard time getting her blood sugar to read at home.  in triage.     Headache     Onset yesterday evening.      This 43 y.o. Female pt presents to the ED with c/o Chest pain and Headache. The pt reports the headache started this morning and the chest pain started at 12:00 noon today. Pt states that she has also been having a hard time getting her blood sugar to read at home. Pt's  when taken here in the ED. When examined the pt's heart was beating very fast.     The history is provided by the patient. No  was used.     Review of patient's allergies indicates:  No Known Allergies  Past Medical History:   Diagnosis Date    Diabetes mellitus      Past Surgical History:   Procedure Laterality Date    ANGIOGRAM, CORONARY, WITH LEFT HEART CATHETERIZATION N/A 2024    Procedure: Angiogram, Coronary, with Left Heart Cath;  Surgeon: Zion Valenzuela DO;  Location: [facility] CATH LAB;  Service: Cardiology;  Laterality: N/A;     SECTION      CHOLECYSTECTOMY       No family history on file.  Social History     Tobacco Use    Smoking status: Never    Smokeless tobacco: Never   Substance Use Topics    Alcohol use: Not Currently    Drug use: Never     Review of Systems   Constitutional:  Negative for fever.   Respiratory:  Negative for cough and shortness of breath.    Cardiovascular:  Positive for chest pain. Negative for leg swelling.   Gastrointestinal:  Negative for abdominal pain, diarrhea, nausea and vomiting.   Neurological:  Positive for headaches.       Physical Exam     Initial Vitals [24 1535]   BP Pulse Resp Temp SpO2   (!) 237/94 (!) 119 18 98 °F (36.7 °C)  99 %      MAP       --         Physical Exam    Constitutional: She appears well-developed and well-nourished.   HENT:   Head: Normocephalic and atraumatic.   Right Ear: External ear normal.   Left Ear: External ear normal.   Nose: Nose normal. Mouth/Throat: Oropharynx is clear and moist.   Eyes: Conjunctivae and EOM are normal. Pupils are equal, round, and reactive to light.   Neck: Neck supple.   Normal range of motion.  Cardiovascular:  Normal rate, regular rhythm, normal heart sounds and intact distal pulses.           Pulmonary/Chest: Breath sounds normal.   Abdominal: Abdomen is soft. Bowel sounds are normal.   Genitourinary:    Vagina and uterus normal.     Musculoskeletal:         General: Normal range of motion.      Cervical back: Normal range of motion and neck supple.     Neurological: She is alert and oriented to person, place, and time. She has normal strength and normal reflexes.   Skin: Skin is warm. Capillary refill takes less than 2 seconds.   Psychiatric: She has a normal mood and affect. Her behavior is normal. Judgment and thought content normal.         ED Course   Procedures  Labs Reviewed   COMPREHENSIVE METABOLIC PANEL - Abnormal       Result Value    Sodium 136      Potassium 3.5      Chloride 104      CO2 25      Anion Gap 11      Glucose 248 (*)     BUN 13      Creatinine 0.77      BUN/Creatinine Ratio 17      Calcium 8.6      Total Protein 6.8      Albumin 2.5 (*)     Globulin 4.3 (*)     A/G Ratio 0.6      Bilirubin, Total 0.1      Alk Phos 70      ALT 9      AST 34      eGFR 97     TROPONIN I - Abnormal    Troponin I High Sensitivity 23.2 (*)    CBC WITH DIFFERENTIAL - Abnormal    WBC 9.92      RBC 4.25      Hemoglobin 10.7 (*)     Hematocrit 35.5 (*)     MCV 83.5      MCH 25.2 (*)     MCHC 30.1 (*)     RDW 14.1      Platelet Count 398      MPV 10.5      Neutrophils % 69.4 (*)     Lymphocytes % 24.1 (*)     Monocytes % 5.2      Eosinophils % 0.5 (*)     Basophils % 0.4      Immature  Granulocytes % 0.4      nRBC, Auto 0.0      Neutrophils, Abs 6.88      Lymphocytes, Absolute 2.39      Monocytes, Absolute 0.52      Eosinophils, Absolute 0.05      Basophils, Absolute 0.04      Immature Granulocytes, Absolute 0.04      nRBC, Absolute 0.00      Diff Type Auto     TROPONIN I - Abnormal    Troponin I High Sensitivity 27.4 (*)    HEMOGLOBIN A1C - Abnormal    Hemoglobin A1C 13.7 (*)     Estimated Average Glucose 346     POCT GLUCOSE MONITORING CONTINUOUS - Abnormal    POC Glucose 237 (*)    POCT GLUCOSE MONITORING CONTINUOUS - Abnormal    POC Glucose 150 (*)    NT-PRO NATRIURETIC PEPTIDE - Normal    ProBNP 58     PROTIME-INR - Normal    PT 13.4      INR 1.03     MAGNESIUM - Normal    Magnesium 1.9     APTT - Normal    PTT 27.8     HCG, SERUM, QUALITATIVE - Normal    Pregnancy, Serum Negative     CBC W/ AUTO DIFFERENTIAL    Narrative:     The following orders were created for panel order CBC auto differential.  Procedure                               Abnormality         Status                     ---------                               -----------         ------                     CBC with Differential[[id number]]       Abnormal            Final result                 Please view results for these tests on the individual orders.   TYPE & SCREEN    Specimen Outdate 11/24/2024 23:59      Group & Rh B POS      Indirect Polo NEG       EKG Readings: (Independently Interpreted)   Heart Rate: 106 bpm.   Sinus tachycardia  Widespread ST-T abnormality may be due to myocardial ischemia  Abnormal ECG     ECG Results              EKG 12-lead (Final result)        Collection Time Result Time QRS Duration OHS QTC Calculation    11/21/24 15:44:59 11/22/24 11:22:41 80 427                     Final result by Interface, Lab In [facility] (11/22/24 11:22:51)                   Narrative:    Test Reason : R07.9,    Vent. Rate : 106 BPM     Atrial Rate :    BPM     P-R Int : 124 ms          QRS Dur :  80 ms      QT Int : 346  ms       P-R-T Axes :  65  65 -62 degrees    QTcB Int : 427 ms    Sinus tachycardia  Widespread ST-T abnormality may be due to myocardial ischemia  Abnormal ECG    Confirmed by Monae Underwood ([id number]) on 11/22/2024 11:22:40 AM    Referred By: CASTILLOERRAL SELF           Confirmed By: Monae Underwood                                  Imaging Results              X-Ray Chest AP Portable (Final result)  Result time 11/21/24 16:14:06      Final result by Jayce Chase MD (11/21/24 16:14:06)                   Impression:      No acute intrathoracic process.      Electronically signed by: Jayce Chase MD  Date:    11/21/2024  Time:    16:14               Narrative:    EXAMINATION:  XR CHEST AP PORTABLE    CLINICAL HISTORY:  Chest pain, unspecified    TECHNIQUE:  Single frontal view of the chest was performed.    COMPARISON:  None    FINDINGS:  Monitoring EKG leads are present.  The trachea is unremarkable.  The cardiomediastinal silhouette is prominent, most likely exaggerated by the AP technique.  There is no evidence of free air beneath the hemidiaphragms.  There are no pleural effusions.  There is no evidence of a pneumothorax.  There is no evidence of pneumomediastinum.  No airspace opacity is present.  There are scattered areas of subsegmental atelectasis.  The osseous structures are unremarkable.                                    X-Rays:   Independently Interpreted Readings:   Other Readings:  Details      Reading Physician Reading Date Result Priority  Jayce Chase MD  [phone]  [phone] 11/21/2024 STAT    Narrative & Impression  EXAMINATION:  XR CHEST AP PORTABLE     CLINICAL HISTORY:  Chest pain, unspecified     TECHNIQUE:  Single frontal view of the chest was performed.     COMPARISON:  None     FINDINGS:  Monitoring EKG leads are present.  The trachea is unremarkable.  The cardiomediastinal silhouette is prominent, most likely exaggerated by the AP technique.  There is no evidence of free air beneath the  hemidiaphragms.  There are no pleural effusions.  There is no evidence of a pneumothorax.  There is no evidence of pneumomediastinum.  No airspace opacity is present.  There are scattered areas of subsegmental atelectasis.  The osseous structures are unremarkable.     Impression:     No acute intrathoracic process.        Electronically signed by:Jayce Chase MD  Date:                                            11/21/2024  Time:                                           16:14      Exam Ended: 11/21/24 16:11 CST Last Resulted: 11/21/24 16:14 CST        Medications   aspirin tablet 325 mg (325 mg Oral Given 11/21/24 2300)   heparin 25,000 units in dextrose 5% (100 units/ml) IV bolus from bag LOW INTENSITY nomogram - RUSH (4,000 Units Intravenous Bolus from Bag 11/21/24 2679)     Medical Decision Making            Attending Attestation:           Physician Attestation for Scribe:  Physician Attestation Statement for Scribe #1: I, Jason Reardon MD, reviewed documentation, as scribed by Ana Mcmanus in my presence, and it is both accurate and complete.             ED Course as of 11/25/24 0141   Thu Nov 21, 2024   1624 11/21/24 1616  X-Ray Chest AP Portable  Performed: 11/21/24 1611  Final         Impression: No acute intrathoracic process. Electronically signed by: Jayce Chase MD Date: 11/21/2024 Time: 16:14       [CM]   1755 Troponin I High Sensitivity(!!): 23.2 [CM]      ED Course User Index  [CM] Ana Mcmanus                 Medical Decision Making:   Initial Assessment:   This 43 y.o. Female pt presents to the ED with c/o Chest pain and Headache. The pt reports the headache started this morning and the chest pain started at 12:00 noon today. Pt states that she has also been having a hard time getting her blood sugar to read at home. Pt's  when taken here in the ED. When examined the pt's heart was beating very fast.     The history is provided by the patient. No  was used.      Differential Diagnosis:   Chest pain             Clinical Impression:  Final diagnoses:  [R07.9] Chest pain          ED Disposition Condition    Admit                 Marquise Daly,   11/25/24 0141

## 2024-11-21 NOTE — LETTER
November 23, 2024    Cathryn North  [address] [facility] [address]                   [address] [place] [id number]  Phone: [phone]  Fax: [phone]   November 23, 2024     Patient: Cathryn North   YOB: 1980   Date of Visit: 11/21/2024       To Whom it May Concern:    Cathryn North was seen on 11/21/2024 and discharged on 11/23/2024. She may return to work on Monday 11/25/2024, no heavy lifting anything over 10 lbs for a week .    Please excuse her from any classes or work missed.    If you have any questions or concerns, please don't hesitate to call.    Sincerely,         Rimma Evans RN

## 2024-11-22 PROBLEM — I16.0 HYPERTENSIVE URGENCY: Status: ACTIVE | Noted: 2024-11-22

## 2024-11-22 PROBLEM — I16.1 HYPERTENSIVE EMERGENCY: Status: ACTIVE | Noted: 2024-11-22

## 2024-11-22 PROBLEM — I21.9 ACUTE MYOCARDIAL INFARCTION: Status: ACTIVE | Noted: 2024-11-21

## 2024-11-22 LAB
ANION GAP SERPL CALCULATED.3IONS-SCNC: 7 MMOL/L (ref 7–16)
AORTIC ROOT ANNULUS: 2.84 CM
APTT PPP: 28.8 SECONDS (ref 25.2–37.3)
APTT PPP: 30.4 SECONDS (ref 25.2–37.3)
AV INDEX (PROSTH): 0.68
AV MEAN GRADIENT: 4.2 MMHG
AV PEAK GRADIENT: 7.8 MMHG
AV VALVE AREA BY VELOCITY RATIO: 3 CM²
AV VALVE AREA: 2.8 CM²
AV VELOCITY RATIO: 0.71
BASOPHILS # BLD AUTO: 0.06 K/UL (ref 0–0.2)
BASOPHILS NFR BLD AUTO: 0.6 % (ref 0–1)
BSA FOR ECHO PROCEDURE: 2.03 M2
BUN SERPL-MCNC: 10 MG/DL (ref 7–19)
BUN/CREAT SERPL: 17 (ref 6–20)
CALCIUM SERPL-MCNC: 8.2 MG/DL (ref 8.4–10.2)
CATH EF QUANTITATIVE: 55 %
CHLORIDE SERPL-SCNC: 106 MMOL/L (ref 98–107)
CO2 SERPL-SCNC: 26 MMOL/L (ref 22–29)
CREAT SERPL-MCNC: 0.59 MG/DL (ref 0.55–1.02)
CV ECHO LV RWT: 0.67 CM
DIFFERENTIAL METHOD BLD: ABNORMAL
DOP CALC AO PEAK VEL: 1.4 M/S
DOP CALC AO VTI: 28.2 CM
DOP CALC LVOT AREA: 4.2 CM2
DOP CALC LVOT DIAMETER: 2.3 CM
DOP CALC LVOT PEAK VEL: 1 M/S
DOP CALC LVOT STROKE VOLUME: 79.7 CM3
DOP CALCLVOT PEAK VEL VTI: 19.2 CM
E WAVE DECELERATION TIME: 234.86 MSEC
E/A RATIO: 0.85
E/E' RATIO: 9.57 M/S
ECHO LV POSTERIOR WALL: 1.3 CM (ref 0.6–1.1)
EGFR (NO RACE VARIABLE) (RUSH/TITUS): 113 ML/MIN/1.73M2
EOSINOPHIL # BLD AUTO: 0.07 K/UL (ref 0–0.5)
EOSINOPHIL NFR BLD AUTO: 0.6 % (ref 1–4)
ERYTHROCYTE [DISTWIDTH] IN BLOOD BY AUTOMATED COUNT: 14.2 % (ref 11.5–14.5)
FRACTIONAL SHORTENING: 20.5 % (ref 28–44)
GLUCOSE SERPL-MCNC: 118 MG/DL (ref 70–105)
GLUCOSE SERPL-MCNC: 120 MG/DL (ref 74–100)
GLUCOSE SERPL-MCNC: 188 MG/DL (ref 70–105)
GLUCOSE SERPL-MCNC: 97 MG/DL (ref 70–105)
HCT VFR BLD AUTO: 32.8 % (ref 38–47)
HGB BLD-MCNC: 10.2 G/DL (ref 12–16)
IMM GRANULOCYTES # BLD AUTO: 0.03 K/UL (ref 0–0.04)
IMM GRANULOCYTES NFR BLD: 0.3 % (ref 0–0.4)
INTERVENTRICULAR SEPTUM: 1.7 CM (ref 0.6–1.1)
IVC DIAMETER: 1.74 CM
LEFT ATRIUM SIZE: 4.2 CM
LEFT ATRIUM VOLUME INDEX MOD: 36.9 ML/M2
LEFT ATRIUM VOLUME MOD: 72 ML
LEFT INTERNAL DIMENSION IN SYSTOLE: 3.1 CM (ref 2.1–4)
LEFT VENTRICLE DIASTOLIC VOLUME INDEX: 50.69 ML/M2
LEFT VENTRICLE DIASTOLIC VOLUME: 98.85 ML
LEFT VENTRICLE MASS INDEX: 115.2 G/M2
LEFT VENTRICLE SYSTOLIC VOLUME INDEX: 19.4 ML/M2
LEFT VENTRICLE SYSTOLIC VOLUME: 37.75 ML
LEFT VENTRICULAR INTERNAL DIMENSION IN DIASTOLE: 3.9 CM (ref 3.5–6)
LEFT VENTRICULAR MASS: 224.6 G
LV LATERAL E/E' RATIO: 9.57 M/S
LV SEPTAL E/E' RATIO: 9.57 M/S
LVED V (TEICH): 98.85 ML
LVES V (TEICH): 37.75 ML
LVOT MG: 2.1 MMHG
LVOT MV: 0.68 CM/S
LYMPHOCYTES # BLD AUTO: 3.82 K/UL (ref 1–4.8)
LYMPHOCYTES NFR BLD AUTO: 35.1 % (ref 27–41)
MCH RBC QN AUTO: 25.6 PG (ref 27–31)
MCHC RBC AUTO-ENTMCNC: 31.1 G/DL (ref 32–36)
MCV RBC AUTO: 82.4 FL (ref 80–96)
MONOCYTES # BLD AUTO: 0.5 K/UL (ref 0–0.8)
MONOCYTES NFR BLD AUTO: 4.6 % (ref 2–6)
MPC BLD CALC-MCNC: 10.6 FL (ref 9.4–12.4)
MV PEAK A VEL: 0.79 M/S
MV PEAK E VEL: 0.67 M/S
MV STENOSIS PRESSURE HALF TIME: 68.11 MS
MV VALVE AREA P 1/2 METHOD: 3.23 CM2
NEUTROPHILS # BLD AUTO: 6.41 K/UL (ref 1.8–7.7)
NEUTROPHILS NFR BLD AUTO: 58.8 % (ref 53–65)
NRBC # BLD AUTO: 0 X10E3/UL
NRBC, AUTO (.00): 0 %
OHS CV RV/LV RATIO: 0.67 CM
OHS QRS DURATION: 80 MS
OHS QTC CALCULATION: 427 MS
PLATELET # BLD AUTO: 395 K/UL (ref 150–400)
POTASSIUM SERPL-SCNC: 3.3 MMOL/L (ref 3.5–5.1)
PV PEAK GRADIENT: 3 MMHG
PV PEAK VELOCITY: 0.9 M/S
RA PRESSURE ESTIMATED: 3 MMHG
RA VOL SYS: 24.9 ML
RBC # BLD AUTO: 3.98 M/UL (ref 4.2–5.4)
RIGHT ATRIAL AREA: 12.7 CM2
RIGHT ATRIUM VOLUME AREA LENGTH APICAL 4 CHAMBER: 24.04 ML
RIGHT VENTRICLE DIASTOLIC BASEL DIMENSION: 2.6 CM
RIGHT VENTRICLE DIASTOLIC LENGTH: 7.1 CM
RIGHT VENTRICLE DIASTOLIC MID DIMENSION: 2 CM
RIGHT VENTRICULAR LENGTH IN DIASTOLE (APICAL 4-CHAMBER VIEW): 7.14 CM
RV MID DIAMA: 2 CM
SODIUM SERPL-SCNC: 136 MMOL/L (ref 136–145)
TDI LATERAL: 0.07 M/S
TDI SEPTAL: 0.07 M/S
TDI: 0.07 M/S
TRICUSPID ANNULAR PLANE SYSTOLIC EXCURSION: 1.88 CM
TROPONIN I SERPL HS-MCNC: 28.6 NG/L
WBC # BLD AUTO: 10.89 K/UL (ref 4.5–11)
Z-SCORE OF LEFT VENTRICULAR DIMENSION IN END DIASTOLE: -3.55
Z-SCORE OF LEFT VENTRICULAR DIMENSION IN END SYSTOLE: -0.78

## 2024-11-22 PROCEDURE — 25500020 PHARM REV CODE 255: Performed by: INTERNAL MEDICINE

## 2024-11-22 PROCEDURE — 82962 GLUCOSE BLOOD TEST: CPT

## 2024-11-22 PROCEDURE — 25000003 PHARM REV CODE 250: Performed by: INTERNAL MEDICINE

## 2024-11-22 PROCEDURE — 85730 THROMBOPLASTIN TIME PARTIAL: CPT | Performed by: STUDENT IN AN ORGANIZED HEALTH CARE EDUCATION/TRAINING PROGRAM

## 2024-11-22 PROCEDURE — 63600175 PHARM REV CODE 636 W HCPCS

## 2024-11-22 PROCEDURE — 93458 L HRT ARTERY/VENTRICLE ANGIO: CPT | Mod: 26,,, | Performed by: INTERNAL MEDICINE

## 2024-11-22 PROCEDURE — 63600175 PHARM REV CODE 636 W HCPCS: Performed by: INTERNAL MEDICINE

## 2024-11-22 PROCEDURE — C1894 INTRO/SHEATH, NON-LASER: HCPCS | Performed by: INTERNAL MEDICINE

## 2024-11-22 PROCEDURE — 36415 COLL VENOUS BLD VENIPUNCTURE: CPT | Performed by: STUDENT IN AN ORGANIZED HEALTH CARE EDUCATION/TRAINING PROGRAM

## 2024-11-22 PROCEDURE — B2151ZZ FLUOROSCOPY OF LEFT HEART USING LOW OSMOLAR CONTRAST: ICD-10-PCS | Performed by: INTERNAL MEDICINE

## 2024-11-22 PROCEDURE — 99152 MOD SED SAME PHYS/QHP 5/>YRS: CPT | Performed by: INTERNAL MEDICINE

## 2024-11-22 PROCEDURE — 99233 SBSQ HOSP IP/OBS HIGH 50: CPT | Mod: ,,, | Performed by: HOSPITALIST

## 2024-11-22 PROCEDURE — 99153 MOD SED SAME PHYS/QHP EA: CPT | Performed by: INTERNAL MEDICINE

## 2024-11-22 PROCEDURE — 94799 UNLISTED PULMONARY SVC/PX: CPT

## 2024-11-22 PROCEDURE — 27201423 OPTIME MED/SURG SUP & DEVICES STERILE SUPPLY: Performed by: INTERNAL MEDICINE

## 2024-11-22 PROCEDURE — B2111ZZ FLUOROSCOPY OF MULTIPLE CORONARY ARTERIES USING LOW OSMOLAR CONTRAST: ICD-10-PCS | Performed by: INTERNAL MEDICINE

## 2024-11-22 PROCEDURE — 99152 MOD SED SAME PHYS/QHP 5/>YRS: CPT | Mod: ,,, | Performed by: INTERNAL MEDICINE

## 2024-11-22 PROCEDURE — 85730 THROMBOPLASTIN TIME PARTIAL: CPT | Performed by: HOSPITALIST

## 2024-11-22 PROCEDURE — 93458 L HRT ARTERY/VENTRICLE ANGIO: CPT | Performed by: INTERNAL MEDICINE

## 2024-11-22 PROCEDURE — 80048 BASIC METABOLIC PNL TOTAL CA: CPT | Performed by: INTERNAL MEDICINE

## 2024-11-22 PROCEDURE — 11000001 HC ACUTE MED/SURG PRIVATE ROOM

## 2024-11-22 PROCEDURE — 85025 COMPLETE CBC W/AUTO DIFF WBC: CPT | Performed by: INTERNAL MEDICINE

## 2024-11-22 PROCEDURE — 94761 N-INVAS EAR/PLS OXIMETRY MLT: CPT

## 2024-11-22 PROCEDURE — 4A023N7 MEASUREMENT OF CARDIAC SAMPLING AND PRESSURE, LEFT HEART, PERCUTANEOUS APPROACH: ICD-10-PCS | Performed by: INTERNAL MEDICINE

## 2024-11-22 PROCEDURE — 99900035 HC TECH TIME PER 15 MIN (STAT)

## 2024-11-22 PROCEDURE — 36415 COLL VENOUS BLD VENIPUNCTURE: CPT | Performed by: INTERNAL MEDICINE

## 2024-11-22 PROCEDURE — 99223 1ST HOSP IP/OBS HIGH 75: CPT | Mod: 25,,, | Performed by: INTERNAL MEDICINE

## 2024-11-22 PROCEDURE — 36415 COLL VENOUS BLD VENIPUNCTURE: CPT | Performed by: HOSPITALIST

## 2024-11-22 RX ORDER — IOPAMIDOL 755 MG/ML
INJECTION, SOLUTION INTRAVASCULAR
Status: DISCONTINUED | OUTPATIENT
Start: 2024-11-22 | End: 2024-11-22 | Stop reason: HOSPADM

## 2024-11-22 RX ORDER — FENTANYL CITRATE 50 UG/ML
INJECTION, SOLUTION INTRAMUSCULAR; INTRAVENOUS
Status: DISCONTINUED | OUTPATIENT
Start: 2024-11-22 | End: 2024-11-22 | Stop reason: HOSPADM

## 2024-11-22 RX ORDER — SODIUM CHLORIDE 9 MG/ML
INJECTION, SOLUTION INTRAVENOUS
Status: DISCONTINUED | OUTPATIENT
Start: 2024-11-22 | End: 2024-11-23 | Stop reason: HOSPADM

## 2024-11-22 RX ORDER — ONDANSETRON 4 MG/1
8 TABLET, ORALLY DISINTEGRATING ORAL EVERY 8 HOURS PRN
Status: DISCONTINUED | OUTPATIENT
Start: 2024-11-22 | End: 2024-11-23 | Stop reason: HOSPADM

## 2024-11-22 RX ORDER — ACETAMINOPHEN 325 MG/1
650 TABLET ORAL EVERY 4 HOURS PRN
Status: DISCONTINUED | OUTPATIENT
Start: 2024-11-22 | End: 2024-11-23 | Stop reason: HOSPADM

## 2024-11-22 RX ORDER — SODIUM CHLORIDE 450 MG/100ML
INJECTION, SOLUTION INTRAVENOUS CONTINUOUS
Status: DISCONTINUED | OUTPATIENT
Start: 2024-11-22 | End: 2024-11-22

## 2024-11-22 RX ORDER — LIDOCAINE HYDROCHLORIDE 10 MG/ML
INJECTION, SOLUTION INFILTRATION; PERINEURAL
Status: DISCONTINUED | OUTPATIENT
Start: 2024-11-22 | End: 2024-11-22 | Stop reason: HOSPADM

## 2024-11-22 RX ORDER — MIDAZOLAM HYDROCHLORIDE 5 MG/ML
INJECTION INTRAMUSCULAR; INTRAVENOUS
Status: DISCONTINUED | OUTPATIENT
Start: 2024-11-22 | End: 2024-11-22 | Stop reason: HOSPADM

## 2024-11-22 RX ADMIN — INSULIN GLARGINE 60 UNITS: 100 INJECTION, SOLUTION SUBCUTANEOUS at 08:11

## 2024-11-22 RX ADMIN — ACETAMINOPHEN 650 MG: 325 TABLET ORAL at 08:11

## 2024-11-22 RX ADMIN — SODIUM CHLORIDE: 4.5 INJECTION, SOLUTION INTRAVENOUS at 12:11

## 2024-11-22 RX ADMIN — METOPROLOL TARTRATE 12.5 MG: 25 TABLET, FILM COATED ORAL at 08:11

## 2024-11-22 RX ADMIN — ATORVASTATIN CALCIUM 80 MG: 40 TABLET, FILM COATED ORAL at 08:11

## 2024-11-22 RX ADMIN — ASPIRIN 81 MG: 81 TABLET, COATED ORAL at 08:11

## 2024-11-22 RX ADMIN — LISINOPRIL 5 MG: 5 TABLET ORAL at 08:11

## 2024-11-22 NOTE — PLAN OF CARE
Problem: Adult Inpatient Plan of Care  Goal: Plan of Care Review  Outcome: Progressing  Flowsheets (Taken 11/22/2024 0201)  Plan of Care Reviewed With: patient  Goal: Patient-Specific Goal (Individualized)  Outcome: Progressing  Goal: Absence of Hospital-Acquired Illness or Injury  Outcome: Progressing  Goal: Optimal Comfort and Wellbeing  Outcome: Progressing  Goal: Readiness for Transition of Care  Outcome: Progressing     Problem: Diabetes Comorbidity  Goal: Blood Glucose Level Within Targeted Range  Outcome: Progressing

## 2024-11-22 NOTE — ASSESSMENT & PLAN NOTE
Last A1c reviewed-   Lab Results   Component Value Date    HGBA1C 13.7 (H) 11/21/2024     - uncontrolled, being followed by primary team

## 2024-11-22 NOTE — NURSING
Rec'd patient back from cath lab. Patient awake and alert. VSS. R groin C/D/I without any evidence of bleeding or hematoma. Patient educated on bed rest and keeping leg straight. HERO

## 2024-11-22 NOTE — HPI
Patient is a 43-year-old female with a history of type 2 diabetes on insulin reportedly with poor control but without any known diabetic complications to date who presents emergency room today with a chief complaint of chest pain.  Patient stated that chest pain is fairly well localized to the substernal area with no radiation.  Patient characterized the pain as sharp in nature +8/10 in its intensity.  Associated symptoms include shortness of breath but patient otherwise denied any presence of diaphoresis nausea or vomiting.  Patient stated that initially aforementioned symptoms lasted about 15 minutes at a time but after a few hours it became constant which ultimately culminated in ED visit.  Patient has not tried any medications to alleviate these symptoms.    On initial presentation, vital signs were stable and patient was afebrile workup was notable for mildly elevated troponin levels in the 20s with flat delta troponin in the setting of widespread ST-T depression with no previous EKG for comparison.  The rest of the workup was otherwise unremarkable.  Patient will be admitted for further evaluation and intervention

## 2024-11-22 NOTE — PROGRESS NOTES
[facility] - [facility] Medical Telemetry  Wound Care    Patient Name:  Cathryn North   MRN:  [id number]  Date: 11/22/2024  Diagnosis: NSTEMI (non-ST elevated myocardial infarction)    History:     Past Medical History:   Diagnosis Date    Diabetes mellitus        Social History     Socioeconomic History    Marital status:    Tobacco Use    Smoking status: Never    Smokeless tobacco: Never   Substance and Sexual Activity    Alcohol use: Not Currently    Drug use: Never     Social Drivers of Health     Financial Resource Strain: Low Risk  (11/22/2024)    Overall Financial Resource Strain (CARDIA)     Difficulty of Paying Living Expenses: Not hard at all   Food Insecurity: No Food Insecurity (11/22/2024)    Hunger Vital Sign     Worried About Running Out of Food in the Last Year: Never true     Ran Out of Food in the Last Year: Never true   Transportation Needs: No Transportation Needs (11/22/2024)    TRANSPORTATION NEEDS     Transportation : No   Stress: No Stress Concern Present (11/22/2024)    [employer] - Occupational Stress Questionnaire     Feeling of Stress : Not at all   Housing Stability: Low Risk  (11/22/2024)    Housing Stability Vital Sign     Unable to Pay for Housing in the Last Year: No     Homeless in the Last Year: No       Precautions:     Allergies as of 11/21/2024    (No Known Allergies)       WO Assessment Details/Treatment   Narrative: Seen patient for initial preventative skin care measures.  Patient ambulates.  No foam borders, open wounds, or redness noted to bilateral heels and sacral.  Consult wound care of any findings.      11/22/2024

## 2024-11-22 NOTE — CONSULTS
[facility] - [facility] Medical Telemetry  Cardiology  Consult Note    Patient Name: Cathryn North  MRN: [id number]  Admission Date: 11/21/2024  Hospital Length of Stay: 1 days  Code Status: Full Code   Attending Provider: Roland Farrell MD   Consulting Provider: MALINDA Nuñez  Primary Care Physician: No, Primary Doctor  Principal Problem:NSTEMI (non-ST elevated myocardial infarction)    Patient information was obtained from patient, ER records, and primary team.     Inpatient consult to Cardiology  Consult performed by: Kaci Jhaveri FNP  Consult ordered by: Luciano Jon MD  Reason for consult: NSTEMI  Assessment/Recommendations: PT seen and examined, chart reviewed, essentially agree with findings as documented    CC: Chest pain  HPI: pt reports exertional chest pain, 8/10 at most severe, provoked by exercise, relieved with rest, starting several weeks ago.  She notes pain is provoked at lower levels of exertion, lasts longer, as is more severe, until yesterday when pain did not resolve until she was treated in ER.l    PE: agree with above  PMH: reviewed,  Labs, xrays, EKGs, tele reviewed.    IMP/Plan;    1. Chest pain with crescendo pattern, abnormal cardiac enzemes, consistent with ACS. Recommend LHC/poss, risks and benefits discussed, pt elects to proceed, will schedule at next available cath time.         Subjective:     Chief Complaint:  chest pain     HPI:   43-year-old female with PMH of uncontrolled DM2 on insulin who presents to the emergency room today with a chief complaint of chest pain.  Patient stated that chest pain is fairly well localized to the substernal area with no radiation.  Patient characterized the pain as sharp in nature and +8/10 in its intensity.  Associated symptoms include shortness of breath but patient otherwise denied any presence of diaphoresis nausea or vomiting.  Patient stated that initially aforementioned symptoms lasted about 15 minutes at a time but  "after a few hours it became constant which ultimately culminated in ED visit.  Patient has not tried any medications to alleviate these symptoms.     On initial presentation, vital signs were stable and patient was afebrile. Workup was notable for mildly elevated troponin levels in the 20s with flat delta troponin in the setting of widespread ST-T depression with no previous EKG for comparison.  The rest of the workup was otherwise unremarkable.  Patient will be admitted for further evaluation and intervention.    Cardiology consulted for NSTEMI. Patient endorses intermittent exertional chest pain that it is relieved with rest for a while now, along with "heart pounding". States her mother passed away from a massive MI in her 40s.    Past Medical History:   Diagnosis Date    Diabetes mellitus        Past Surgical History:   Procedure Laterality Date     SECTION      CHOLECYSTECTOMY         Review of patient's allergies indicates:  No Known Allergies    No current facility-administered medications on file prior to encounter.     Current Outpatient Medications on File Prior to Encounter   Medication Sig    insulin glargine U-100, Lantus, (LANTUS U-100 INSULIN) 100 unit/mL injection Inject 100 Units into the skin every evening. (Patient taking differently: Inject 120 Units into the skin every evening.)    lisinopriL (PRINIVIL,ZESTRIL) 20 MG tablet Take 1 tablet (20 mg total) by mouth once daily.    metFORMIN (GLUCOPHAGE) 1000 MG tablet Take 1 tablet (1,000 mg total) by mouth 2 (two) times daily.    ACCU-CHEK GUIDE GLUCOSE METER Misc use as directed    ACCU-CHEK GUIDE TEST STRIPS Strp 1 strip by Misc.(Non-Drug; Combo Route) route 2 (two) times a day.     Family History    None       Tobacco Use    Smoking status: Never    Smokeless tobacco: Never   Substance and Sexual Activity    Alcohol use: Not Currently    Drug use: Never    Sexual activity: Not on file     Review of Systems   Constitutional: Negative for " chills and fever.   HENT: Negative.     Eyes: Negative.    Cardiovascular:  Positive for chest pain, leg swelling and palpitations.   Respiratory:  Positive for shortness of breath.    Hematologic/Lymphatic: Negative.    Gastrointestinal: Negative.    Genitourinary: Negative.    Neurological: Negative.      Objective:     Vital Signs (Most Recent):  Temp: 98.3 °F (36.8 °C) (11/22/24 0707)  Pulse: 91 (11/22/24 0707)  Resp: 19 (11/22/24 0707)  BP: 131/72 (11/22/24 0707)  SpO2: 96 % (11/22/24 0920) Vital Signs (24h Range):  Temp:  [98 °F (36.7 °C)-98.3 °F (36.8 °C)] 98.3 °F (36.8 °C)  Pulse:  [] 91  Resp:  [14-22] 19  SpO2:  [94 %-100 %] 96 %  BP: (119-237)/(64-94) 131/72     Weight: 93 kg (205 lb)  Body mass index is 36.31 kg/m².    SpO2: 96 %         Intake/Output Summary (Last 24 hours) at 11/22/2024 1042  Last data filed at 11/22/2024 0912  Gross per 24 hour   Intake 0 ml   Output --   Net 0 ml       Lines/Drains/Airways       Peripheral Intravenous Line  Duration                  Peripheral IV - Single Lumen 11/21/24 1900 20 G Left;Posterior Forearm <1 day                     Physical Exam  Vitals reviewed.   Constitutional:       General: She is not in acute distress.  HENT:      Nose: Nose normal.      Mouth/Throat:      Mouth: Mucous membranes are moist.      Pharynx: Oropharynx is clear.   Eyes:      General: No scleral icterus.     Pupils: Pupils are equal, round, and reactive to light.   Neck:      Vascular: No carotid bruit.   Cardiovascular:      Rate and Rhythm: Normal rate and regular rhythm.      Heart sounds: Normal heart sounds.   Pulmonary:      Effort: Pulmonary effort is normal.      Breath sounds: Normal breath sounds.   Abdominal:      General: Bowel sounds are normal.      Palpations: Abdomen is soft.   Musculoskeletal:      Right lower leg: Edema (mild) present.      Left lower leg: Edema (mild) present.   Skin:     General: Skin is warm and dry.   Neurological:      Mental Status: She is  "alert and oriented to person, place, and time.          Significant Labs: ABG: No results for input(s): "PH", "PCO2", "HCO3", "POCSATURATED", "BE" in the last 48 hours., Blood Culture: No results for input(s): "LABBLOO" in the last 48 hours., BMP:   Recent Labs   Lab 24  1554 24  0445   * 120*    136   K 3.5 3.3*    106   CO2 25 26   BUN 13 10   CREATININE 0.77 0.59   CALCIUM 8.6 8.2*   MG 1.9  --    , CMP   Recent Labs   Lab 24  1554 24  0445    136   K 3.5 3.3*    106   CO2 25 26   * 120*   BUN 13 10   CREATININE 0.77 0.59   CALCIUM 8.6 8.2*   PROT 6.8  --    ALBUMIN 2.5*  --    BILITOT 0.1  --    ALKPHOS 70  --    AST 34  --    ALT 9  --    ANIONGAP 11 7   , CBC   Recent Labs   Lab 24  1554 24  0445   WBC 9.92 10.89   HGB 10.7* 10.2*   HCT 35.5* 32.8*    395   , INR   Recent Labs   Lab 24  1554   INR 1.03   , Lipid Panel   Recent Labs   Lab 24  2225   CHOL 173   HDL 39   LDLCALC 109   TRIG 125   CHOLHDL 4.4   , and Troponin No results for input(s): "TROPONINI" in the last 48 hours.    Significant Imaging:, Echocardiogram: Transthoracic echo (TTE) complete (Cupid Only): No results found for this or any previous visit., EKG: No results found for this or any previous visit. , and X-Ray: CXR: X-Ray Chest 1 View (CXR): No results found for this visit on 24.  Assessment and Plan:     * NSTEMI (non-ST elevated myocardial infarction)  - patient seen and evaluated by Dr. Valenzuela  - Troponin 23, 27, 28 and flat; EKG sinus tach with widespread ST-T wave abnormalities; Echo pending  - Hgb A1C 13.7,   - Ischemia vs Type 2 MI secondary to hypertension emergency; Given multiple risk factors including significant family hx of premature CAD (mom , MI 40s) we will plan for [facility] today for further evaluation.  - Procedure, risk, and benefits discussed in detail with patient. She verbalized understanding and wishes to " proceed. Consent obtained and on chart.  - Further recommendations to follow.      Hypertensive urgency  - now controlled, continue current medications    Type 2 diabetes mellitus without complication  Last A1c reviewed-   Lab Results   Component Value Date    HGBA1C 13.7 (H) 11/21/2024     - uncontrolled, being followed by primary team        VTE Risk Mitigation (From admission, onward)           Ordered     heparin 25,000 units in dextrose 5% 250 mL (100 units/mL) infusion LOW INTENSITY nomogram - [facility]  Continuous        Question:  Begin at (units/kg/hr)  Answer:  12    11/21/24 2159     heparin 25,000 units in dextrose 5% (100 units/ml) IV bolus from bag LOW INTENSITY nomogram - [facility]  As needed (PRN)        Question:  Heparin Infusion Adjustment (DO NOT MODIFY ANSWER)  Answer:  [facility]\epic\Images\Pharmacy\HeparinInfusions\heparin LOW INTENSITY nomogram for [facility] [id number]    11/21/24 2159     heparin 25,000 units in dextrose 5% (100 units/ml) IV bolus from bag LOW INTENSITY nomogram - [facility]  As needed (PRN)        Question:  Heparin Infusion Adjustment (DO NOT MODIFY ANSWER)  Answer:  \\[facility]\epic\Images\Pharmacy\HeparinInfusions\heparin LOW INTENSITY nomogram for [facility] [id number]    11/21/24 2159     Reason for No Pharmacological VTE Prophylaxis  Once        Comments: Patient will be on heparin infusion   Question:  Reasons:  Answer:  Physician Provided (leave comment)    11/21/24 2154     IP VTE HIGH RISK PATIENT  Once         11/21/24 2154     Place sequential compression device  Until discontinued         11/21/24 2154                    Thank you for your consult. I will follow-up with patient. Please contact us if you have any additional questions.    Kaci Jhaveri, KISHANP  Cardiology   [facility] - [address] [place] [address]

## 2024-11-22 NOTE — ASSESSMENT & PLAN NOTE
While patient only had minimally elevated troponin levels with negative delta troponin, patient did have widespread ST-T abnormalities on EKG with no previous EKG for comparison.  Patient has LAN score of 3 points which places her at 13% risk at 14 days of all cause mortality, new or recurrent MI, or severe recurrent ischemia requiring urgent revascularization and as such will start patient on ACS protocol.  Cardiology will be consulted

## 2024-11-22 NOTE — SUBJECTIVE & OBJECTIVE
Past Medical History:   Diagnosis Date    Diabetes mellitus        Past Surgical History:   Procedure Laterality Date     SECTION      CHOLECYSTECTOMY         Review of patient's allergies indicates:  No Known Allergies    No current facility-administered medications on file prior to encounter.     Current Outpatient Medications on File Prior to Encounter   Medication Sig    insulin glargine U-100, Lantus, (LANTUS U-100 INSULIN) 100 unit/mL injection Inject 100 Units into the skin every evening. (Patient taking differently: Inject 120 Units into the skin every evening.)    lisinopriL (PRINIVIL,ZESTRIL) 20 MG tablet Take 1 tablet (20 mg total) by mouth once daily.    metFORMIN (GLUCOPHAGE) 1000 MG tablet Take 1 tablet (1,000 mg total) by mouth 2 (two) times daily.    ACCU-CHEK GUIDE GLUCOSE METER Misc use as directed    ACCU-CHEK GUIDE TEST STRIPS Strp 1 strip by Misc.(Non-Drug; Combo Route) route 2 (two) times a day.     Family History    None       Tobacco Use    Smoking status: Never    Smokeless tobacco: Never   Substance and Sexual Activity    Alcohol use: Not Currently    Drug use: Never    Sexual activity: Not on file     Review of Systems   Constitutional: Negative for chills and fever.   HENT: Negative.     Eyes: Negative.    Cardiovascular:  Positive for chest pain, leg swelling and palpitations.   Respiratory:  Positive for shortness of breath.    Hematologic/Lymphatic: Negative.    Gastrointestinal: Negative.    Genitourinary: Negative.    Neurological: Negative.      Objective:     Vital Signs (Most Recent):  Temp: 98.3 °F (36.8 °C) (24 07)  Pulse: 91 (24 07)  Resp: 19 (24 07)  BP: 131/72 (24 0707)  SpO2: 96 % (24 0920) Vital Signs (24h Range):  Temp:  [98 °F (36.7 °C)-98.3 °F (36.8 °C)] 98.3 °F (36.8 °C)  Pulse:  [] 91  Resp:  [14-22] 19  SpO2:  [94 %-100 %] 96 %  BP: (119-237)/(64-94) 131/72     Weight: 93 kg (205 lb)  Body mass index is 36.31  "kg/m².    SpO2: 96 %         Intake/Output Summary (Last 24 hours) at 11/22/2024 1042  Last data filed at 11/22/2024 0912  Gross per 24 hour   Intake 0 ml   Output --   Net 0 ml       Lines/Drains/Airways       Peripheral Intravenous Line  Duration                  Peripheral IV - Single Lumen 11/21/24 1900 20 G Left;Posterior Forearm <1 day                     Physical Exam  Vitals reviewed.   Constitutional:       General: She is not in acute distress.  HENT:      Nose: Nose normal.      Mouth/Throat:      Mouth: Mucous membranes are moist.      Pharynx: Oropharynx is clear.   Eyes:      General: No scleral icterus.     Pupils: Pupils are equal, round, and reactive to light.   Neck:      Vascular: No carotid bruit.   Cardiovascular:      Rate and Rhythm: Normal rate and regular rhythm.      Heart sounds: Normal heart sounds.   Pulmonary:      Effort: Pulmonary effort is normal.      Breath sounds: Normal breath sounds.   Abdominal:      General: Bowel sounds are normal.      Palpations: Abdomen is soft.   Musculoskeletal:      Right lower leg: Edema (mild) present.      Left lower leg: Edema (mild) present.   Skin:     General: Skin is warm and dry.   Neurological:      Mental Status: She is alert and oriented to person, place, and time.          Significant Labs: ABG: No results for input(s): "PH", "PCO2", "HCO3", "POCSATURATED", "BE" in the last 48 hours., Blood Culture: No results for input(s): "LABBLOO" in the last 48 hours., BMP:   Recent Labs   Lab 11/21/24  1554 11/22/24  0445   * 120*    136   K 3.5 3.3*    106   CO2 25 26   BUN 13 10   CREATININE 0.77 0.59   CALCIUM 8.6 8.2*   MG 1.9  --    , CMP   Recent Labs   Lab 11/21/24  1554 11/22/24  0445    136   K 3.5 3.3*    106   CO2 25 26   * 120*   BUN 13 10   CREATININE 0.77 0.59   CALCIUM 8.6 8.2*   PROT 6.8  --    ALBUMIN 2.5*  --    BILITOT 0.1  --    ALKPHOS 70  --    AST 34  --    ALT 9  --    ANIONGAP 11 7   , CBC " "  Recent Labs   Lab 11/21/24  1554 11/22/24  0445   WBC 9.92 10.89   HGB 10.7* 10.2*   HCT 35.5* 32.8*    395   , INR   Recent Labs   Lab 11/21/24  1554   INR 1.03   , Lipid Panel   Recent Labs   Lab 11/21/24  2225   CHOL 173   HDL 39   LDLCALC 109   TRIG 125   CHOLHDL 4.4   , and Troponin No results for input(s): "TROPONINI" in the last 48 hours.    Significant Imaging:, Echocardiogram: Transthoracic echo (TTE) complete (Cupid Only): No results found for this or any previous visit., EKG: No results found for this or any previous visit. , and X-Ray: CXR: X-Ray Chest 1 View (CXR): No results found for this visit on 11/21/24.  "

## 2024-11-22 NOTE — SUBJECTIVE & OBJECTIVE
Past Medical History:   Diagnosis Date    Diabetes mellitus        Past Surgical History:   Procedure Laterality Date     SECTION      CHOLECYSTECTOMY         Review of patient's allergies indicates:  No Known Allergies    No current facility-administered medications on file prior to encounter.     Current Outpatient Medications on File Prior to Encounter   Medication Sig    insulin glargine U-100, Lantus, (LANTUS U-100 INSULIN) 100 unit/mL injection Inject 100 Units into the skin every evening. (Patient taking differently: Inject 120 Units into the skin every evening.)    lisinopriL (PRINIVIL,ZESTRIL) 20 MG tablet Take 1 tablet (20 mg total) by mouth once daily.    metFORMIN (GLUCOPHAGE) 1000 MG tablet Take 1 tablet (1,000 mg total) by mouth 2 (two) times daily.    ACCU-CHEK GUIDE GLUCOSE METER Misc use as directed    ACCU-CHEK GUIDE TEST STRIPS Strp 1 strip by Misc.(Non-Drug; Combo Route) route 2 (two) times a day.    [DISCONTINUED] fluconazole (DIFLUCAN) 200 MG Tab Take 1 tablet (200 mg total) by mouth once daily.    [DISCONTINUED] lisinopriL 10 MG tablet Take 10 mg by mouth once daily.    [DISCONTINUED] ondansetron (ZOFRAN) 4 MG tablet Take 1 tablet (4 mg total) by mouth daily as needed for Nausea.    [DISCONTINUED] sulfamethoxazole-trimethoprim 800-160mg (BACTRIM DS) 800-160 mg Tab Take 1 tablet by mouth 2 (two) times daily.     Family History    None       Tobacco Use    Smoking status: Never    Smokeless tobacco: Never   Substance and Sexual Activity    Alcohol use: Not Currently    Drug use: Never    Sexual activity: Not on file     Review of Systems   Constitutional:  Negative for chills and fever.   HENT:  Negative for postnasal drip and rhinorrhea.    Eyes:  Negative for itching.   Respiratory:  Positive for shortness of breath.    Cardiovascular:  Positive for chest pain. Negative for palpitations and leg swelling.   Gastrointestinal:  Negative for abdominal distention, abdominal pain, diarrhea,  nausea and vomiting.   Endocrine: Negative for cold intolerance, heat intolerance, polydipsia, polyphagia and polyuria.   Genitourinary:  Negative for dysuria and hematuria.   Musculoskeletal:  Negative for arthralgias, joint swelling, myalgias, neck pain and neck stiffness.   Skin:  Negative for pallor and rash.   Allergic/Immunologic: Negative for environmental allergies, food allergies and immunocompromised state.   Neurological:  Negative for dizziness, seizures, facial asymmetry, light-headedness and numbness.     Objective:     Vital Signs (Most Recent):  Temp: 98 °F (36.7 °C) (11/21/24 1535)  Pulse: 94 (11/21/24 2113)  Resp: 20 (11/21/24 2113)  BP: 136/74 (11/21/24 2113)  SpO2: 97 % (11/21/24 2113) Vital Signs (24h Range):  Temp:  [98 °F (36.7 °C)] 98 °F (36.7 °C)  Pulse:  [] 94  Resp:  [14-22] 20  SpO2:  [94 %-100 %] 97 %  BP: (136-237)/(64-94) 136/74     Weight: 93.9 kg (207 lb)  Body mass index is 37.86 kg/m².     Physical Exam  Constitutional:       Appearance: Normal appearance. She is obese.   HENT:      Head: Normocephalic and atraumatic.      Nose: Nose normal.      Mouth/Throat:      Mouth: Mucous membranes are moist.   Eyes:      Extraocular Movements: Extraocular movements intact.   Cardiovascular:      Rate and Rhythm: Normal rate and regular rhythm.      Pulses: Normal pulses.      Heart sounds: Normal heart sounds.   Pulmonary:      Effort: Pulmonary effort is normal.      Breath sounds: Normal breath sounds.   Abdominal:      General: Bowel sounds are normal.      Palpations: Abdomen is soft.   Musculoskeletal:         General: Normal range of motion.      Cervical back: Normal range of motion and neck supple.   Skin:     General: Skin is warm.      Capillary Refill: Capillary refill takes less than 2 seconds.   Neurological:      General: No focal deficit present.      Mental Status: She is alert and oriented to person, place, and time.   Psychiatric:         Mood and Affect: Mood  normal.         Behavior: Behavior normal.                Significant Labs: All pertinent labs within the past 24 hours have been reviewed.    Significant Imaging: I have reviewed all pertinent imaging results/findings within the past 24 hours.

## 2024-11-22 NOTE — NURSING
11/22/2024 Pt aptt ordered for 0500, lab collected lab and results have yet to be posted. Called lab x2 to check on status of results no answer. Heparin running at continuous rate will continue to monitor pt and notify on coming nurse.

## 2024-11-22 NOTE — ASSESSMENT & PLAN NOTE
- patient seen and evaluated by Dr. Valenzuela  - Troponin 23, 27, 28 and flat; EKG sinus tach with widespread ST-T wave abnormalities; Echo pending  - Hgb A1C 13.7,   - Ischemia vs Type 2 MI secondary to hypertension emergency; Given multiple risk factors including significant family hx of premature CAD (mom , MI 40s) we will plan for [facility] today for further evaluation.  - Procedure, risk, and benefits discussed in detail with patient. She verbalized understanding and wishes to proceed. Consent obtained and on chart.  - Further recommendations to follow.

## 2024-11-22 NOTE — ASSESSMENT & PLAN NOTE
Patient's FSGs are uncontrolled due to hyperglycemia on current medication regimen.  Last A1c reviewed-   Lab Results   Component Value Date    HGBA1C 13.2 (H) 04/24/2024     Most recent fingerstick glucose reviewed- 248  Current correctional scale  Medium  Maintain anti-hyperglycemic dose as follows-   Antihyperglycemics (From admission, onward)      Start     Stop Route Frequency Ordered    11/22/24 0900  insulin glargine U-100 (Lantus) injection 60 Units         -- SubQ Daily 11/21/24 2126 11/21/24 2226  insulin aspart U-100 injection 0-10 Units         -- SubQ Every 6 hours PRN 11/21/24 2126          Hold Oral hypoglycemics while patient is in the hospital.

## 2024-11-22 NOTE — HPI
"43-year-old female with PMH of uncontrolled DM2 on insulin who presents to the emergency room today with a chief complaint of chest pain.  Patient stated that chest pain is fairly well localized to the substernal area with no radiation.  Patient characterized the pain as sharp in nature and +8/10 in its intensity.  Associated symptoms include shortness of breath but patient otherwise denied any presence of diaphoresis nausea or vomiting.  Patient stated that initially aforementioned symptoms lasted about 15 minutes at a time but after a few hours it became constant which ultimately culminated in ED visit.  Patient has not tried any medications to alleviate these symptoms.     On initial presentation, vital signs were stable and patient was afebrile. Workup was notable for mildly elevated troponin levels in the 20s with flat delta troponin in the setting of widespread ST-T depression with no previous EKG for comparison.  The rest of the workup was otherwise unremarkable.  Patient will be admitted for further evaluation and intervention.    Cardiology consulted for NSTEMI. Patient endorses intermittent exertional chest pain that it is relieved with rest for a while now, along with "heart pounding". States her mother passed away from a massive MI in her 40s.  "

## 2024-11-22 NOTE — PHARMACY MED REC
"Admission Medication History     The home medication history was taken by Brenda Maxwell.    You may go to "Admission" then "Reconcile Home Medications" tabs to review and/or act upon these items.     The home medication list has been updated by the Pharmacy department.   Please read ALL comments highlighted in yellow.   Please address this information as you see fit.    Feel free to contact us if you have any questions or require assistance.    The patient stated she took all of her medications, including Lantus, today. Her pharmacy was already closed so I was not able to verify last  date at this time.       The medications listed below were removed from the home medication list. Please reorder if appropriate:  Patient reports no longer taking the following medication(s):  Fluconazole 200 mg  Lisinopril 10 mg  Ondansetron 4 mg  Bactrim -160 mg    Medications listed below were obtained from: Patient/family, Analytic software- [employer], and Medical records  (Not in a hospital admission)        Current Outpatient Medications on File Prior to Encounter   Medication Sig Dispense Refill Last Dose/Taking    insulin glargine U-100, Lantus, (LANTUS U-100 INSULIN) 100 unit/mL injection Inject 100 Units into the skin every evening. 10 mL 2 11/21/2024    lisinopriL (PRINIVIL,ZESTRIL) 20 MG tablet Take 1 tablet (20 mg total) by mouth once daily. 90 tablet 1 11/21/2024    metFORMIN (GLUCOPHAGE) 1000 MG tablet Take 1 tablet (1,000 mg total) by mouth 2 (two) times daily. 90 tablet 1 11/21/2024    ACCU-CHEK GUIDE GLUCOSE METER Misc use as directed       ACCU-CHEK GUIDE TEST STRIPS Strp 1 strip by Misc.(Non-Drug; Combo Route) route 2 (two) times a day. 200 each 2     [DISCONTINUED] fluconazole (DIFLUCAN) 200 MG Tab Take 1 tablet (200 mg total) by mouth once daily. 5 tablet 2     [DISCONTINUED] lisinopriL 10 MG tablet Take 10 mg by mouth once daily.       [DISCONTINUED] ondansetron (ZOFRAN) 4 MG tablet Take 1 tablet (4 " mg total) by mouth daily as needed for Nausea. 30 tablet 0     [DISCONTINUED] sulfamethoxazole-trimethoprim 800-160mg (BACTRIM DS) 800-160 mg Tab Take 1 tablet by mouth 2 (two) times daily. 20 tablet 0        Potential issues to be addressed PRIOR TO DISCHARGE  Please discuss with the patient barriers to adherence with medication treatment plans      Brenda Maxwell  EXT [id number]                 .

## 2024-11-22 NOTE — H&P
[facility] - Emergency Department  [facility] Medicine  History & Physical    Patient Name: Cathryn North  MRN: [id number]  Patient Class: Emergency  Admission Date: 11/21/2024  Attending Physician:  NAZANIN Salas DO  Primary Care Provider: No, Primary Doctor         Patient information was obtained from patient and ER records.     Subjective:     Principal Problem:NSTEMI (non-ST elevated myocardial infarction)    Chief Complaint:   Chief Complaint   Patient presents with    Chest Pain     Presents POV complaining of chest pain that onset this morning. No cardiac hx. States that she has also been having a hard time getting her blood sugar to read at home.  in triage.     Headache     Onset yesterday evening.         HPI: Patient is a 43-year-old female with a history of type 2 diabetes on insulin reportedly with poor control but without any known diabetic complications to date who presents to the emergency room today with a chief complaint of chest pain.  Patient stated that chest pain is fairly well localized to the substernal area with no radiation.  Patient characterized the pain as sharp in nature and +8/10 in its intensity.  Associated symptoms include shortness of breath but patient otherwise denied any presence of diaphoresis nausea or vomiting.  Patient stated that initially aforementioned symptoms lasted about 15 minutes at a time but after a few hours it became constant which ultimately culminated in ED visit.  Patient has not tried any medications to alleviate these symptoms.    On initial presentation, vital signs were stable and patient was afebrile. Workup was notable for mildly elevated troponin levels in the 20s with flat delta troponin in the setting of widespread ST-T depression with no previous EKG for comparison.  The rest of the workup was otherwise unremarkable.  Patient will be admitted for further evaluation and intervention    Past Medical History:   Diagnosis Date    Diabetes  mellitus        Past Surgical History:   Procedure Laterality Date     SECTION      CHOLECYSTECTOMY         Review of patient's allergies indicates:  No Known Allergies    No current facility-administered medications on file prior to encounter.     Current Outpatient Medications on File Prior to Encounter   Medication Sig    insulin glargine U-100, Lantus, (LANTUS U-100 INSULIN) 100 unit/mL injection Inject 100 Units into the skin every evening. (Patient taking differently: Inject 120 Units into the skin every evening.)    lisinopriL (PRINIVIL,ZESTRIL) 20 MG tablet Take 1 tablet (20 mg total) by mouth once daily.    metFORMIN (GLUCOPHAGE) 1000 MG tablet Take 1 tablet (1,000 mg total) by mouth 2 (two) times daily.    ACCU-CHEK GUIDE GLUCOSE METER Misc use as directed    ACCU-CHEK GUIDE TEST STRIPS Strp 1 strip by Misc.(Non-Drug; Combo Route) route 2 (two) times a day.    [DISCONTINUED] fluconazole (DIFLUCAN) 200 MG Tab Take 1 tablet (200 mg total) by mouth once daily.    [DISCONTINUED] lisinopriL 10 MG tablet Take 10 mg by mouth once daily.    [DISCONTINUED] ondansetron (ZOFRAN) 4 MG tablet Take 1 tablet (4 mg total) by mouth daily as needed for Nausea.    [DISCONTINUED] sulfamethoxazole-trimethoprim 800-160mg (BACTRIM DS) 800-160 mg Tab Take 1 tablet by mouth 2 (two) times daily.     Family History    None       Tobacco Use    Smoking status: Never    Smokeless tobacco: Never   Substance and Sexual Activity    Alcohol use: Not Currently    Drug use: Never    Sexual activity: Not on file     Review of Systems   Constitutional:  Negative for chills and fever.   HENT:  Negative for postnasal drip and rhinorrhea.    Eyes:  Negative for itching.   Respiratory:  Positive for shortness of breath.    Cardiovascular:  Positive for chest pain. Negative for palpitations and leg swelling.   Gastrointestinal:  Negative for abdominal distention, abdominal pain, diarrhea, nausea and vomiting.   Endocrine: Negative for cold  intolerance, heat intolerance, polydipsia, polyphagia and polyuria.   Genitourinary:  Negative for dysuria and hematuria.   Musculoskeletal:  Negative for arthralgias, joint swelling, myalgias, neck pain and neck stiffness.   Skin:  Negative for pallor and rash.   Allergic/Immunologic: Negative for environmental allergies, food allergies and immunocompromised state.   Neurological:  Negative for dizziness, seizures, facial asymmetry, light-headedness and numbness.     Objective:     Vital Signs (Most Recent):  Temp: 98 °F (36.7 °C) (11/21/24 1535)  Pulse: 94 (11/21/24 2113)  Resp: 20 (11/21/24 2113)  BP: 136/74 (11/21/24 2113)  SpO2: 97 % (11/21/24 2113) Vital Signs (24h Range):  Temp:  [98 °F (36.7 °C)] 98 °F (36.7 °C)  Pulse:  [] 94  Resp:  [14-22] 20  SpO2:  [94 %-100 %] 97 %  BP: (136-237)/(64-94) 136/74     Weight: 93.9 kg (207 lb)  Body mass index is 37.86 kg/m².     Physical Exam  Constitutional:       Appearance: Normal appearance. She is obese.   HENT:      Head: Normocephalic and atraumatic.      Nose: Nose normal.      Mouth/Throat:      Mouth: Mucous membranes are moist.   Eyes:      Extraocular Movements: Extraocular movements intact.   Cardiovascular:      Rate and Rhythm: Normal rate and regular rhythm.      Pulses: Normal pulses.      Heart sounds: Normal heart sounds.   Pulmonary:      Effort: Pulmonary effort is normal.      Breath sounds: Normal breath sounds.   Abdominal:      General: Bowel sounds are normal.      Palpations: Abdomen is soft.   Musculoskeletal:         General: Normal range of motion.      Cervical back: Normal range of motion and neck supple.   Skin:     General: Skin is warm.      Capillary Refill: Capillary refill takes less than 2 seconds.   Neurological:      General: No focal deficit present.      Mental Status: She is alert and oriented to person, place, and time.   Psychiatric:         Mood and Affect: Mood normal.         Behavior: Behavior normal.                 Significant Labs: All pertinent labs within the past 24 hours have been reviewed.    Significant Imaging: I have reviewed all pertinent imaging results/findings within the past 24 hours.  Assessment/Plan:     * NSTEMI (non-ST elevated myocardial infarction)    While patient only had minimally elevated troponin levels with negative delta troponin, patient did have widespread ST-T depressions on EKG with no previous EKG for comparison.      Patient has a LAN score of 3 points which places her at 13% risk at 14 days of all cause mortality, new or recurrent MI, or severe recurrent ischemia requiring urgent revascularization and as such will start patient on ACS protocol.  Cardiology will be consulted      Type 2 diabetes mellitus without complication    Patient's FSGs are uncontrolled due to hyperglycemia on current medication regimen.  Last A1c reviewed-   Lab Results   Component Value Date    HGBA1C 13.2 (H) 04/24/2024     Most recent fingerstick glucose reviewed- 248  Current correctional scale  Medium  Maintain anti-hyperglycemic dose as follows-   Antihyperglycemics (From admission, onward)      Start     Stop Route Frequency Ordered    11/22/24 0900  insulin glargine U-100 (Lantus) injection 60 Units         -- SubQ Daily 11/21/24 2126 11/21/24 2226  insulin aspart U-100 injection 0-10 Units         -- SubQ Every 6 hours PRN 11/21/24 2126          Hold Oral hypoglycemics while patient is in the hospital.      VTE Risk Mitigation (From admission, onward)      Heparin infusion                            Luciano Jon MD  Department of Hospital Medicine  [facility] - Emergency Department

## 2024-11-23 VITALS
HEART RATE: 86 BPM | RESPIRATION RATE: 18 BRPM | BODY MASS INDEX: 36.32 KG/M2 | HEIGHT: 63 IN | WEIGHT: 205 LBS | TEMPERATURE: 98 F | OXYGEN SATURATION: 97 % | DIASTOLIC BLOOD PRESSURE: 78 MMHG | SYSTOLIC BLOOD PRESSURE: 151 MMHG

## 2024-11-23 PROBLEM — E66.9 OBESITY (BMI 30-39.9): Status: ACTIVE | Noted: 2024-11-23

## 2024-11-23 LAB
ANION GAP SERPL CALCULATED.3IONS-SCNC: 7 MMOL/L (ref 7–16)
BUN SERPL-MCNC: 11 MG/DL (ref 7–19)
BUN/CREAT SERPL: 17 (ref 6–20)
CALCIUM SERPL-MCNC: 8 MG/DL (ref 8.4–10.2)
CHLORIDE SERPL-SCNC: 105 MMOL/L (ref 98–107)
CO2 SERPL-SCNC: 25 MMOL/L (ref 22–29)
CREAT SERPL-MCNC: 0.66 MG/DL (ref 0.55–1.02)
EGFR (NO RACE VARIABLE) (RUSH/TITUS): 110 ML/MIN/1.73M2
GLUCOSE SERPL-MCNC: 143 MG/DL (ref 70–105)
GLUCOSE SERPL-MCNC: 155 MG/DL (ref 74–100)
GLUCOSE SERPL-MCNC: 204 MG/DL (ref 70–105)
POTASSIUM SERPL-SCNC: 3.6 MMOL/L (ref 3.5–5.1)
SODIUM SERPL-SCNC: 133 MMOL/L (ref 136–145)
TSH SERPL DL<=0.005 MIU/L-ACNC: 1.96 UIU/ML (ref 0.35–4.94)

## 2024-11-23 PROCEDURE — 63600175 PHARM REV CODE 636 W HCPCS: Performed by: INTERNAL MEDICINE

## 2024-11-23 PROCEDURE — 36415 COLL VENOUS BLD VENIPUNCTURE: CPT | Performed by: INTERNAL MEDICINE

## 2024-11-23 PROCEDURE — 84443 ASSAY THYROID STIM HORMONE: CPT | Performed by: HOSPITALIST

## 2024-11-23 PROCEDURE — 99239 HOSP IP/OBS DSCHRG MGMT >30: CPT | Mod: ,,, | Performed by: HOSPITALIST

## 2024-11-23 PROCEDURE — 80048 BASIC METABOLIC PNL TOTAL CA: CPT | Performed by: INTERNAL MEDICINE

## 2024-11-23 PROCEDURE — 82962 GLUCOSE BLOOD TEST: CPT

## 2024-11-23 PROCEDURE — 25000003 PHARM REV CODE 250: Performed by: INTERNAL MEDICINE

## 2024-11-23 RX ORDER — LISINOPRIL 10 MG/1
10 TABLET ORAL DAILY
Qty: 90 TABLET | Refills: 0 | Status: SHIPPED | OUTPATIENT
Start: 2024-11-23 | End: 2025-02-21

## 2024-11-23 RX ORDER — METOPROLOL SUCCINATE 25 MG/1
12.5 TABLET, EXTENDED RELEASE ORAL DAILY
Qty: 45 TABLET | Refills: 0 | Status: SHIPPED | OUTPATIENT
Start: 2024-11-23 | End: 2025-02-21

## 2024-11-23 RX ORDER — ASPIRIN 81 MG/1
81 TABLET ORAL DAILY
Qty: 30 TABLET | Refills: 1 | Status: SHIPPED | OUTPATIENT
Start: 2024-11-23 | End: 2025-11-23

## 2024-11-23 RX ORDER — TIRZEPATIDE 2.5 MG/.5ML
2.5 INJECTION, SOLUTION SUBCUTANEOUS
Qty: 2 ML | Refills: 0 | Status: SHIPPED | OUTPATIENT
Start: 2024-11-23 | End: 2024-12-21

## 2024-11-23 RX ORDER — ATORVASTATIN CALCIUM 40 MG/1
40 TABLET, FILM COATED ORAL DAILY
Qty: 90 TABLET | Refills: 0 | Status: SHIPPED | OUTPATIENT
Start: 2024-11-23 | End: 2025-11-23

## 2024-11-23 RX ADMIN — ATORVASTATIN CALCIUM 80 MG: 40 TABLET, FILM COATED ORAL at 08:11

## 2024-11-23 RX ADMIN — METOPROLOL TARTRATE 12.5 MG: 25 TABLET, FILM COATED ORAL at 08:11

## 2024-11-23 RX ADMIN — INSULIN ASPART 2 UNITS: 100 INJECTION, SOLUTION INTRAVENOUS; SUBCUTANEOUS at 12:11

## 2024-11-23 RX ADMIN — INSULIN GLARGINE 60 UNITS: 100 INJECTION, SOLUTION SUBCUTANEOUS at 08:11

## 2024-11-23 RX ADMIN — LISINOPRIL 5 MG: 5 TABLET ORAL at 08:11

## 2024-11-23 RX ADMIN — ASPIRIN 81 MG: 81 TABLET, COATED ORAL at 08:11

## 2024-11-23 NOTE — DISCHARGE SUMMARY
[facility] - [facility] Hospital Medicine  Discharge Summary      Patient Name: Cathryn North  MRN: [id number]  DONNELL: [id number]  Patient Class: IP- Inpatient  Admission Date: 11/21/2024  Hospital Length of Stay: 2 days  Discharge Date and Time: No discharge date for patient encounter.  Attending Physician: Roland Farrell MD   Discharging Provider: Roland Farrell MD  Primary Care Provider: Paula, Primary Doctor    Primary Care Team: Networked reference to record PCT     HPI:   Patient is a 43-year-old female with a history of type 2 diabetes on insulin reportedly with poor control but without any known diabetic complications to date who presents emergency room today with a chief complaint of chest pain.  Patient stated that chest pain is fairly well localized to the substernal area with no radiation.  Patient characterized the pain as sharp in nature +8/10 in its intensity.  Associated symptoms include shortness of breath but patient otherwise denied any presence of diaphoresis nausea or vomiting.  Patient stated that initially aforementioned symptoms lasted about 15 minutes at a time but after a few hours it became constant which ultimately culminated in ED visit.  Patient has not tried any medications to alleviate these symptoms.    On initial presentation, vital signs were stable and patient was afebrile workup was notable for mildly elevated troponin levels in the 20s with flat delta troponin in the setting of widespread ST-T depression with no previous EKG for comparison.  The rest of the workup was otherwise unremarkable.  Patient will be admitted for further evaluation and intervention    Procedure(s) (LRB):  Angiogram, Coronary, with Left Heart Cath (N/A)      Hospital Course:   No notes on file     Goals of Care Treatment Preferences:  Code Status: Full Code      SDOH Screening:  The patient was screened for utility difficulties, food insecurity, transport difficulties,  housing insecurity, and interpersonal safety and there were no concerns identified this admission.     Consults:   Consults (From admission, onward)          Status Ordering Provider     Inpatient consult to Cardiology  Once        Provider:  Jitendra Real MD    Completed FRANCIS BARON            Cardiac/Vascular  * Acute myocardial infarction    While patient only had minimally elevated troponin levels with negative delta troponin, patient did have widespread ST-T abnormalities on EKG with no previous EKG for comparison.  Patient has LAN score of 3 points which places her at 13% risk at 14 days of all cause mortality, new or recurrent MI, or severe recurrent ischemia requiring urgent revascularization and as such will start patient on ACS protocol.  Cardiology will be consulted    11/22:  Patient did not require intervention during left heart catheterization today.  Will continue on current cardiovascular medications though.  Follow up with Cardiology for discharge recommendations.    EKG  Sinus tachycardia with ST abnormalities   Echo  EF normal    Risk Score  LAN of 4   DAPT  Aspirin.  Plavix not needed per cardiology.  No stent placed    Beta blocker Toprol 12.5 added    Statin  Atorva 40   Cardiac Rehab  Ordered     Aldactone HFrEF NA    ARNI (Ace/ARB)  HFrEF Lisinopril 10   SGLT Mounjaro ordered (may need prior auth). A1c >13   Cardiology f/u Per cardiology        Hypertensive emergency  Patient has a current diagnosis of Hypertensive emergency with end organ damage evidenced by acute coronary syndrome which is controlled.  Latest blood pressure and vitals reviewed-   Temp:  [98 °F (36.7 °C)-98.5 °F (36.9 °C)]   Pulse:  [85-91]   Resp:  [16-20]   BP: (113-147)/(66-82)   SpO2:  [95 %-98 %] .   Patient currently off IV antihypertensives.   Home meds for hypertension were reviewed and noted below.   Hypertension Medications               lisinopriL (PRINIVIL,ZESTRIL) 20 MG tablet Take 1 tablet (20 mg total)  by mouth once daily.            Medication adjustment for hospital antihypertensives is as follows- Add additional blood pressure mediation if patient able to tolerate.     Will aim for controlled BP reduction by medications noted above. Monitor and mitigate end organ damage as indicated.    11/23: patient reports she may have had hypoglycemia episode.  This may be why she had had hypertension and tachycardia.      Endocrine  Obesity (BMI 30-39.9)  Body mass index is 36.31 kg/m². Morbid obesity complicates all aspects of disease management from diagnostic modalities to treatment. Weight loss encouraged and health benefits explained to patient.     Mounjaro ordered but may need prior Auth.  Can follow-up with Emilie Klein if PCP agreeable or wants to manage internally.   Patient also interested in a wearable glucose monitor.          Type 2 diabetes mellitus without complication    Patient's FSGs are uncontrolled due to hyperglycemia on current medication regimen.  Last A1c reviewed-   Lab Results   Component Value Date    HGBA1C 13.7 (H) 11/21/2024     Most recent fingerstick glucose reviewed- 248  Current correctional scale  Medium  Maintain anti-hyperglycemic dose as follows-   Antihyperglycemics (From admission, onward)      Start     Stop Route Frequency Ordered    11/22/24 0900  insulin glargine U-100 (Lantus) injection 60 Units         -- SubQ Daily 11/21/24 2126 11/21/24 2226  insulin aspart U-100 injection 0-10 Units         -- SubQ Every 6 hours PRN 11/21/24 2126          Hold Oral hypoglycemics while patient is in the hospital.    11/23: if diet is better controlled, she can reduce insulin dose since it increases weight.  She is currently on lantus 60mg bid and metformin.  She will follow-up with her outstanding PCP Dr. Thakkar.        Final Active Diagnoses:    Diagnosis Date Noted POA    PRINCIPAL PROBLEM:  Acute myocardial infarction [I21.9] 11/21/2024 Yes    Hypertensive emergency [I16.1]  11/22/2024 Yes    Type 2 diabetes mellitus without complication [E11.9] 04/29/2024 Yes    Obesity (BMI 30-39.9) [E66.9] 11/23/2024 Yes      Problems Resolved During this Admission:       Discharged Condition: fair    Disposition:     Follow Up:   Follow-up Information       Melani Valencia FNP Follow up on 12/11/2024.    Specialty: Cardiology  Why: Appointment scheduled with Cardiology on 12/11/2024 at 10:00 a.m.  Contact information:  [address] [place] [address] [facility]  [place] [address]  [phone]                           Patient Instructions:      Cardiac rehab phase ii   Standing Status: Future Standing Exp. Date: 11/23/25     Order Specific Question Answer Comments   Department [facility] CARDIAC REHAB        Significant Diagnostic Studies: Labs: BMP:   Recent Labs   Lab 11/21/24  1554 11/22/24  0445 11/23/24  0517   * 120* 155*    136 133*   K 3.5 3.3* 3.6    106 105   CO2 25 26 25   BUN 13 10 11   CREATININE 0.77 0.59 0.66   CALCIUM 8.6 8.2* 8.0*   MG 1.9  --   --     and CBC   Recent Labs   Lab 11/21/24  1554 11/22/24 0445   WBC 9.92 10.89   HGB 10.7* 10.2*   HCT 35.5* 32.8*    395       Pending Diagnostic Studies:       Procedure Component Value Units Date/Time    EXTRA TUBES [[id number]]     Order Status: Sent Lab Status: No result     Specimen: Blood, Venous     Narrative:      The following orders were created for panel order EXTRA TUBES.  Procedure                               Abnormality         Status                     ---------                               -----------         ------                     Gold Top Hold[[id number]]                                                                Please view results for these tests on the individual orders.    EXTRA TUBES [[id number]]     Order Status: Sent Lab Status: No result     Specimen: Blood, Venous     Narrative:      The following orders were created for panel order EXTRA TUBES.  Procedure                                Abnormality         Status                     ---------                               -----------         ------                     Lavender Top Hold[[id number]]                                                            Please view results for these tests on the individual orders.    Gold Top Hold [[id number]]     Order Status: Sent Lab Status: No result     Specimen: Blood, Venous     Lavender Top Hold [[id number]]     Order Status: Sent Lab Status: No result     Specimen: Blood, Venous     TSH [[id number]]     Order Status: Sent Lab Status: No result     Specimen: Blood            Medications:  Reconciled Home Medications:      Medication List        START taking these medications      aspirin 81 MG EC tablet  Commonly known as: ECOTRIN  Take 1 tablet (81 mg total) by mouth once daily.     atorvastatin 40 MG tablet  Commonly known as: LIPITOR  Take 1 tablet (40 mg total) by mouth once daily.     metoprolol succinate 25 MG 24 hr tablet  Commonly known as: TOPROL-XL  Take 0.5 tablets (12.5 mg total) by mouth once daily.     MOUNJARO 2.5 mg/0.5 mL Pnij  Generic drug: tirzepatide  Inject 2.5 mg into the skin every 7 days.            CHANGE how you take these medications      lisinopriL 10 MG tablet  Take 1 tablet (10 mg total) by mouth once daily.  What changed:   medication strength  how much to take            CONTINUE taking these medications      ACCU-CHEK GUIDE GLUCOSE METER Misc  Generic drug: blood-glucose meter  use as directed     ACCU-CHEK GUIDE TEST STRIPS Strp  Generic drug: blood sugar diagnostic  1 strip by Misc.(Non-Drug; Combo Route) route 2 (two) times a day.     LANTUS U-100 INSULIN 100 unit/mL injection  Generic drug: insulin glargine U-100 (Lantus)  Inject 100 Units into the skin every evening.     metFORMIN 1000 MG tablet  Commonly known as: GLUCOPHAGE  Take 1 tablet (1,000 mg total) by mouth 2 (two) times daily.              Indwelling Lines/Drains at time of  discharge:   Lines/Drains/Airways       None                   Time spent on the discharge of patient: 45 minutes         Roland Farrell MD  Department of Hospital Medicine  [facility] - [facility]

## 2024-11-23 NOTE — DISCHARGE INSTRUCTIONS
Home Instructions of cath site   No heavy lifting anything over 10 pounds for the next week  No tube baths, may take shower  May remove dressing in 24 hours and you can leave open to air or apply Band-Aid over area.   Do not apply lotions or ointments to area unless instructed by physician.   Wash incision area with separate was clothe than you use for the rest of your body and pat dry  Apply pressure to area when you sneeze, cough, laugh for the next several days\  Watch for signs of bleeding, if you start bleeding at all you or someone else needs to apply pressure and go to your nearest emergency department and or call 911 if you feel you need to.   It is normal to have a small dime sized hard area at the site, the main thing you want to watch for is if the hard area gets any bigger, if it does notify cardiology and or come to ER  Some bruising is normal but if the bruise continues to become larger than notify cardiology, come to ER.

## 2024-11-23 NOTE — SUBJECTIVE & OBJECTIVE
Interval History:     Review of Systems   Constitutional:  Negative for chills and fever.   HENT:  Negative for postnasal drip and rhinorrhea.    Eyes:  Negative for itching.   Respiratory:  Positive for shortness of breath.    Cardiovascular:  Positive for chest pain. Negative for palpitations and leg swelling.   Gastrointestinal:  Negative for abdominal distention, abdominal pain, diarrhea, nausea and vomiting.   Endocrine: Negative for cold intolerance, heat intolerance, polydipsia, polyphagia and polyuria.   Genitourinary:  Negative for dysuria and hematuria.   Musculoskeletal:  Negative for arthralgias, joint swelling, myalgias, neck pain and neck stiffness.   Skin:  Negative for pallor and rash.   Allergic/Immunologic: Negative for environmental allergies, food allergies and immunocompromised state.   Neurological:  Negative for dizziness, seizures, facial asymmetry, light-headedness and numbness.     Objective:     Vital Signs (Most Recent):  Temp: 98.5 °F (36.9 °C) (11/22/24 2033)  Pulse: 90 (11/22/24 2033)  Resp: 18 (11/22/24 2033)  BP: 136/79 (11/22/24 2033)  SpO2: 97 % (11/22/24 2033) Vital Signs (24h Range):  Temp:  [98 °F (36.7 °C)-98.5 °F (36.9 °C)] 98.5 °F (36.9 °C)  Pulse:  [] 90  Resp:  [16-20] 18  SpO2:  [96 %-98 %] 97 %  BP: (116-177)/(66-92) 136/79     Weight: 93 kg (205 lb)  Body mass index is 36.31 kg/m².    Intake/Output Summary (Last 24 hours) at 11/22/2024 2103  Last data filed at 11/22/2024 1707  Gross per 24 hour   Intake 960 ml   Output --   Net 960 ml         Physical Exam  Constitutional:       Appearance: Normal appearance. She is obese.   HENT:      Head: Normocephalic and atraumatic.      Nose: Nose normal.      Mouth/Throat:      Mouth: Mucous membranes are moist.   Eyes:      Extraocular Movements: Extraocular movements intact.   Cardiovascular:      Rate and Rhythm: Normal rate and regular rhythm.      Pulses: Normal pulses.      Heart sounds: Normal heart sounds.    Pulmonary:      Effort: Pulmonary effort is normal.      Breath sounds: Normal breath sounds.   Abdominal:      General: Bowel sounds are normal.      Palpations: Abdomen is soft.   Musculoskeletal:         General: Normal range of motion.      Cervical back: Normal range of motion and neck supple.   Skin:     General: Skin is warm.      Capillary Refill: Capillary refill takes less than 2 seconds.   Neurological:      General: No focal deficit present.      Mental Status: She is alert and oriented to person, place, and time.   Psychiatric:         Mood and Affect: Mood normal.         Behavior: Behavior normal.             Significant Labs: All pertinent labs within the past 24 hours have been reviewed.    Significant Imaging: I have reviewed all pertinent imaging results/findings within the past 24 hours.

## 2024-11-23 NOTE — ASSESSMENT & PLAN NOTE
Body mass index is 36.31 kg/m². Morbid obesity complicates all aspects of disease management from diagnostic modalities to treatment. Weight loss encouraged and health benefits explained to patient.     Mounjaro ordered but may need prior Auth.  Can follow-up with Emilie Klein if PCP agreeable or wants to manage internally.   Patient also interested in a wearable glucose monitor.

## 2024-11-23 NOTE — ASSESSMENT & PLAN NOTE
Patient's FSGs are uncontrolled due to hyperglycemia on current medication regimen.  Last A1c reviewed-   Lab Results   Component Value Date    HGBA1C 13.7 (H) 11/21/2024     Most recent fingerstick glucose reviewed- 248  Current correctional scale  Medium  Maintain anti-hyperglycemic dose as follows-   Antihyperglycemics (From admission, onward)      Start     Stop Route Frequency Ordered    11/22/24 0900  insulin glargine U-100 (Lantus) injection 60 Units         -- SubQ Daily 11/21/24 2126 11/21/24 2226  insulin aspart U-100 injection 0-10 Units         -- SubQ Every 6 hours PRN 11/21/24 2126          Hold Oral hypoglycemics while patient is in the hospital.    11/23: if diet is better controlled, she can reduce insulin dose since it increases weight.  She is currently on lantus 60mg bid and metformin.  She will follow-up with her outstanding PCP Dr. Thakkar.

## 2024-11-23 NOTE — ASSESSMENT & PLAN NOTE
While patient only had minimally elevated troponin levels with negative delta troponin, patient did have widespread ST-T abnormalities on EKG with no previous EKG for comparison.  Patient has LAN score of 3 points which places her at 13% risk at 14 days of all cause mortality, new or recurrent MI, or severe recurrent ischemia requiring urgent revascularization and as such will start patient on ACS protocol.  Cardiology will be consulted    11/22:  Patient did not require intervention during left heart catheterization today.  Will continue on current cardiovascular medications though.  Follow up with Cardiology for discharge recommendations.    EKG  Sinus tachycardia with ST abnormalities   Echo  EF normal    Risk Score  LAN of 4   DAPT  Aspirin.  Plavix not needed per cardiology.  No stent placed    Beta blocker Toprol 12.5 added    Statin  Atorva 40   Cardiac Rehab  Ordered     Aldactone HFrEF NA    ARNI (Ace/ARB)  HFrEF Lisinopril 10   SGLT Mounjaro ordered (may need prior auth). A1c >13   Cardiology f/u Per cardiology

## 2024-11-23 NOTE — PROGRESS NOTES
[facility] Hospital Medicine  Progress Note    Patient Name: Cathryn North  MRN: [id number]  Patient Class: IP- Inpatient   Admission Date: 11/21/2024  Length of Stay: 1 days  Attending Physician: Roland Farrell MD  Primary Care Provider: Paula, Primary Doctor        Subjective:     Principal Problem:Acute myocardial infarction    HPI:  Patient is a 43-year-old female with a history of type 2 diabetes on insulin reportedly with poor control but without any known diabetic complications to date who presents emergency room today with a chief complaint of chest pain.  Patient stated that chest pain is fairly well localized to the substernal area with no radiation.  Patient characterized the pain as sharp in nature +8/10 in its intensity.  Associated symptoms include shortness of breath but patient otherwise denied any presence of diaphoresis nausea or vomiting.  Patient stated that initially aforementioned symptoms lasted about 15 minutes at a time but after a few hours it became constant which ultimately culminated in ED visit.  Patient has not tried any medications to alleviate these symptoms.    On initial presentation, vital signs were stable and patient was afebrile workup was notable for mildly elevated troponin levels in the 20s with flat delta troponin in the setting of widespread ST-T depression with no previous EKG for comparison.  The rest of the workup was otherwise unremarkable.  Patient will be admitted for further evaluation and intervention    Overview/Hospital Course:  No notes on file    Interval History:     Review of Systems   Constitutional:  Negative for chills and fever.   HENT:  Negative for postnasal drip and rhinorrhea.    Eyes:  Negative for itching.   Respiratory:  Positive for shortness of breath.    Cardiovascular:  Positive for chest pain. Negative for palpitations and leg swelling.   Gastrointestinal:  Negative for abdominal distention, abdominal  pain, diarrhea, nausea and vomiting.   Endocrine: Negative for cold intolerance, heat intolerance, polydipsia, polyphagia and polyuria.   Genitourinary:  Negative for dysuria and hematuria.   Musculoskeletal:  Negative for arthralgias, joint swelling, myalgias, neck pain and neck stiffness.   Skin:  Negative for pallor and rash.   Allergic/Immunologic: Negative for environmental allergies, food allergies and immunocompromised state.   Neurological:  Negative for dizziness, seizures, facial asymmetry, light-headedness and numbness.     Objective:     Vital Signs (Most Recent):  Temp: 98.5 °F (36.9 °C) (11/22/24 2033)  Pulse: 90 (11/22/24 2033)  Resp: 18 (11/22/24 2033)  BP: 136/79 (11/22/24 2033)  SpO2: 97 % (11/22/24 2033) Vital Signs (24h Range):  Temp:  [98 °F (36.7 °C)-98.5 °F (36.9 °C)] 98.5 °F (36.9 °C)  Pulse:  [] 90  Resp:  [16-20] 18  SpO2:  [96 %-98 %] 97 %  BP: (116-177)/(66-92) 136/79     Weight: 93 kg (205 lb)  Body mass index is 36.31 kg/m².    Intake/Output Summary (Last 24 hours) at 11/22/2024 2103  Last data filed at 11/22/2024 1707  Gross per 24 hour   Intake 960 ml   Output --   Net 960 ml         Physical Exam  Constitutional:       Appearance: Normal appearance. She is obese.   HENT:      Head: Normocephalic and atraumatic.      Nose: Nose normal.      Mouth/Throat:      Mouth: Mucous membranes are moist.   Eyes:      Extraocular Movements: Extraocular movements intact.   Cardiovascular:      Rate and Rhythm: Normal rate and regular rhythm.      Pulses: Normal pulses.      Heart sounds: Normal heart sounds.   Pulmonary:      Effort: Pulmonary effort is normal.      Breath sounds: Normal breath sounds.   Abdominal:      General: Bowel sounds are normal.      Palpations: Abdomen is soft.   Musculoskeletal:         General: Normal range of motion.      Cervical back: Normal range of motion and neck supple.   Skin:     General: Skin is warm.      Capillary Refill: Capillary refill takes less  than 2 seconds.   Neurological:      General: No focal deficit present.      Mental Status: She is alert and oriented to person, place, and time.   Psychiatric:         Mood and Affect: Mood normal.         Behavior: Behavior normal.             Significant Labs: All pertinent labs within the past 24 hours have been reviewed.    Significant Imaging: I have reviewed all pertinent imaging results/findings within the past 24 hours.    Assessment/Plan:      * Acute myocardial infarction    While patient only had minimally elevated troponin levels with negative delta troponin, patient did have widespread ST-T abnormalities on EKG with no previous EKG for comparison.  Patient has LAN score of 3 points which places her at 13% risk at 14 days of all cause mortality, new or recurrent MI, or severe recurrent ischemia requiring urgent revascularization and as such will start patient on ACS protocol.  Cardiology will be consulted    11/22:  Patient did not require intervention during left heart catheterization today.  Will continue on current cardiovascular medications though.  Follow up with Cardiology for discharge recommendations.      Hypertensive emergency  Patient has a current diagnosis of Hypertensive emergency with end organ damage evidenced by acute coronary syndrome which is controlled.  Latest blood pressure and vitals reviewed-   Temp:  [98 °F (36.7 °C)-98.5 °F (36.9 °C)]   Pulse:  []   Resp:  [16-20]   BP: (116-177)/(66-92)   SpO2:  [96 %-98 %] .   Patient currently off IV antihypertensives.   Home meds for hypertension were reviewed and noted below.   Hypertension Medications               lisinopriL (PRINIVIL,ZESTRIL) 20 MG tablet Take 1 tablet (20 mg total) by mouth once daily.            Medication adjustment for hospital antihypertensives is as follows- Add additional blood pressure mediation if patient able to tolerate.     Will aim for controlled BP reduction by medications noted above. Monitor and  mitigate end organ damage as indicated.    Type 2 diabetes mellitus without complication    Patient's FSGs are uncontrolled due to hyperglycemia on current medication regimen.  Last A1c reviewed-   Lab Results   Component Value Date    HGBA1C 13.2 (H) 04/24/2024     Most recent fingerstick glucose reviewed- 248  Current correctional scale  Medium  Maintain anti-hyperglycemic dose as follows-   Antihyperglycemics (From admission, onward)      Start     Stop Route Frequency Ordered    11/22/24 0900  insulin glargine U-100 (Lantus) injection 60 Units         -- SubQ Daily 11/21/24 2126 11/21/24 2226  insulin aspart U-100 injection 0-10 Units         -- SubQ Every 6 hours PRN 11/21/24 2126          Hold Oral hypoglycemics while patient is in the hospital.      VTE Risk Mitigation (From admission, onward)           Ordered     Reason for No Pharmacological VTE Prophylaxis  Once        Comments: Patient will be on heparin infusion   Question:  Reasons:  Answer:  Physician Provided (leave comment)    11/21/24 2154     IP VTE HIGH RISK PATIENT  Once         11/21/24 2154     Place sequential compression device  Until discontinued         11/21/24 2154                    Discharge Planning   GARY:      Code Status: Full Code   Is the patient medically ready for discharge?:     Reason for patient still in hospital (select all that apply): Treatment                     Roland Farrell MD  Department of Hospital Medicine   [facility]

## 2024-11-23 NOTE — PLAN OF CARE
"[facility]  Initial Discharge Assessment       Primary Care Provider: Paula, Primary Doctor    Admission Diagnosis: NSTEMI (non-ST elevated myocardial infarction) [I21.4]  Chest pain [R07.9]    Admission Date: 11/21/2024  Expected Discharge Date: 11/23/2024    Transition of Care Barriers: (P) None    Payor: BLUE CROSS BLUE SHIELD / Plan: BCBS ALL OUT OF STATE / Product Type: PPO /     Extended Emergency Contact Information  Primary Emergency Contact: Camron Kaur  Mobile Phone: [phone]  Relation: Spouse  Preferred language: English   needed? No    Discharge Plan A: (P) Home with family  Discharge Plan B: (P) Home Health, Long-term acute care facility (LTAC), Rehab, Skilled Nursing Facility      [facility] [address] [place] [address] [address] [place] [address]  [address] [place] [address]  Phone: [phone] Fax: [phone]      Initial Assessment (most recent)       Adult Discharge Assessment - 11/23/24 1616          Discharge Assessment    Assessment Type Discharge Planning Assessment (P)      Confirmed/corrected address, phone number and insurance Yes (P)      Confirmed Demographics Correct on Facesheet (P)      Source of Information patient (P)      Communicated GARY with patient/caregiver Yes (P)      Reason For Admission Pt states, "Chest pains." (P)      People in Home spouse (P)      Do you expect to return to your current living situation? Yes (P)      Do you have help at home or someone to help you manage your care at home? Yes (P)      Who are your caregiver(s) and their phone number(s)? Price Kaur (Spouse) [phone] (P)      Prior to hospitilization cognitive status: Unable to Assess (P)      Current cognitive status: Alert/Oriented (P)      Walking or Climbing Stairs Difficulty no (P)      Dressing/Bathing Difficulty no (P)      Do you have any problems with: -- (P)    No problems reported    Home Layout Able to live on 1st floor (P)   "    Equipment Currently Used at Home none (P)      Readmission within 30 days? No (P)      Patient currently being followed by outpatient case management? No (P)      Do you currently have service(s) that help you manage your care at home? No (P)      Do you take prescription medications? Yes (P)      Do you have prescription coverage? Yes (P)      Coverage BCBS Out of State (P)      Do you have any problems affording any of your prescribed medications? No (P)      Is the patient taking medications as prescribed? yes (P)      Who is going to help you get home at discharge? Spouse (P)      How do you get to doctors appointments? car, drives self (P)      Are you on dialysis? No (P)      Do you take coumadin? No (P)      Discharge Plan A Home with family (P)      Discharge Plan B Home Health;Long-term acute care facility (LTAC);Rehab;Skilled Nursing Facility (P)      DME Needed Upon Discharge  none (P)      Discharge Plan discussed with: Patient (P)      Transition of Care Barriers None (P)         Physical Activity    On average, how many days per week do you engage in moderate to strenuous exercise (like a brisk walk)? 0 days (P)      On average, how many minutes do you engage in exercise at this level? 0 min (P)         Financial Resource Strain    How hard is it for you to pay for the very basics like food, housing, medical care, and heating? Not hard at all (P)         Housing Stability    In the last 12 months, was there a time when you were not able to pay the mortgage or rent on time? No (P)      At any time in the past 12 months, were you homeless or living in a shelter (including now)? No (P)         Transportation Needs    Has the lack of transportation kept you from medical appointments, meetings, work or from getting things needed for daily living? No (P)         Food Insecurity    Within the past 12 months, you worried that your food would run out before you got the money to buy more. Never true (P)       Within the past 12 months, the food you bought just didn't last and you didn't have money to get more. Never true (P)         Stress    Do you feel stress - tense, restless, nervous, or anxious, or unable to sleep at night because your mind is troubled all the time - these days? Not at all (P)         Social Isolation    How often do you feel lonely or isolated from those around you?  Never (P)         Alcohol Use    Q1: How often do you have a drink containing alcohol? Never (P)      Q2: How many drinks containing alcohol do you have on a typical day when you are drinking? Patient does not drink (P)      Q3: How often do you have six or more drinks on one occasion? Never (P)         Utilities    In the past 12 months has the electric, gas, oil, or water company threatened to shut off services in your home? No (P)         Health Literacy    How often do you need to have someone help you when you read instructions, pamphlets, or other written material from your doctor or pharmacy? Never (P)         OTHER    Name(s) of People in Home Pricemonty Kaur (Spouse) (P)                  SW spoke with pt in room to obtain information for her initial discharge assessment.  Pt lives at home with her spouse and states she will return back home when medically ready. Pt does not have home health and uses no durable medical equipment.  SDOH questions completed.  SS following for discharge needs as they arise.

## 2024-11-23 NOTE — ASSESSMENT & PLAN NOTE
Patient has a current diagnosis of Hypertensive emergency with end organ damage evidenced by acute coronary syndrome which is controlled.  Latest blood pressure and vitals reviewed-   Temp:  [98 °F (36.7 °C)-98.5 °F (36.9 °C)]   Pulse:  []   Resp:  [16-20]   BP: (116-177)/(66-92)   SpO2:  [96 %-98 %] .   Patient currently off IV antihypertensives.   Home meds for hypertension were reviewed and noted below.   Hypertension Medications               lisinopriL (PRINIVIL,ZESTRIL) 20 MG tablet Take 1 tablet (20 mg total) by mouth once daily.            Medication adjustment for hospital antihypertensives is as follows- Add additional blood pressure mediation if patient able to tolerate.     Will aim for controlled BP reduction by medications noted above. Monitor and mitigate end organ damage as indicated.

## 2024-11-23 NOTE — PLAN OF CARE
[facility] [place] [facility]  Discharge Final Note    Primary Care Provider: No, Primary Doctor    Expected Discharge Date: 11/23/2024    Final Discharge Note (most recent)       Final Note - 11/23/24 1621          Final Note    Assessment Type Final Discharge Note (P)      Anticipated Discharge Disposition Home or Self Care (P)      What phone number can be called within the next 1-3 days to see how you are doing after discharge? [phone] (P)      Hospital Resources/Appts/Education Provided Community resources provided (P)         Post-Acute Status    Discharge Delays None known at this time (P)                      Important Message from Medicare             Contact Info       Melani Valencia FNP   Specialty: Cardiology    [address] [place] [address] [place] [id number]   Phone: [phone]       Next Steps: Follow up on 12/11/2024    Instructions: Appointment scheduled with Cardiology on 12/11/2024 at 10:00 a.m.    Myles Thakkar MD   Specialty: Internal Medicine, Family Medicine, Hospitalist    [address] [facility] [address]   Phone: [phone]       Next Steps: Follow up in 1 week(s)    Instructions: hospital follow-up          Pt discharged home to family today.  No further needs at this time.

## 2024-12-02 NOTE — PHYSICIAN QUERY
Question: Please clarify the Cardiac diagnosis.    Provider Query Response:  NSTEMI/Myocardial infarction Type 2 due to (please specify): demand perfusion mismatch

## 2024-12-04 NOTE — PHYSICIAN QUERY
Due to conflicting documentation, Please clarify the hypertensive condition   Hypertensive emergency

## 2024-12-11 ENCOUNTER — OFFICE VISIT (OUTPATIENT)
Dept: CARDIOLOGY | Facility: CLINIC | Age: 44
End: 2024-12-11
Payer: COMMERCIAL

## 2024-12-11 VITALS
BODY MASS INDEX: 37.39 KG/M2 | HEART RATE: 94 BPM | SYSTOLIC BLOOD PRESSURE: 170 MMHG | HEIGHT: 64 IN | DIASTOLIC BLOOD PRESSURE: 120 MMHG | WEIGHT: 219 LBS | OXYGEN SATURATION: 98 %

## 2024-12-11 DIAGNOSIS — I10 PRIMARY HYPERTENSION: ICD-10-CM

## 2024-12-11 DIAGNOSIS — I25.10 NONOCCLUSIVE CORONARY ATHEROSCLEROSIS OF NATIVE CORONARY ARTERY: ICD-10-CM

## 2024-12-11 DIAGNOSIS — I10 HYPERTENSION, UNSPECIFIED TYPE: Primary | ICD-10-CM

## 2024-12-11 PROCEDURE — 3046F HEMOGLOBIN A1C LEVEL >9.0%: CPT | Mod: ,,, | Performed by: NURSE PRACTITIONER

## 2024-12-11 PROCEDURE — 99214 OFFICE O/P EST MOD 30 MIN: CPT | Mod: PBBFAC | Performed by: NURSE PRACTITIONER

## 2024-12-11 PROCEDURE — 3080F DIAST BP >= 90 MM HG: CPT | Mod: ,,, | Performed by: NURSE PRACTITIONER

## 2024-12-11 PROCEDURE — 3077F SYST BP >= 140 MM HG: CPT | Mod: ,,, | Performed by: NURSE PRACTITIONER

## 2024-12-11 PROCEDURE — 3008F BODY MASS INDEX DOCD: CPT | Mod: ,,, | Performed by: NURSE PRACTITIONER

## 2024-12-11 PROCEDURE — 1111F DSCHRG MED/CURRENT MED MERGE: CPT | Mod: ,,, | Performed by: NURSE PRACTITIONER

## 2024-12-11 PROCEDURE — 99999 PR PBB SHADOW E&M-EST. PATIENT-LVL IV: CPT | Mod: PBBFAC,,, | Performed by: NURSE PRACTITIONER

## 2024-12-11 PROCEDURE — 4010F ACE/ARB THERAPY RXD/TAKEN: CPT | Mod: ,,, | Performed by: NURSE PRACTITIONER

## 2024-12-11 PROCEDURE — 99214 OFFICE O/P EST MOD 30 MIN: CPT | Mod: S$PBB,,, | Performed by: NURSE PRACTITIONER

## 2024-12-11 PROCEDURE — 1159F MED LIST DOCD IN RCRD: CPT | Mod: ,,, | Performed by: NURSE PRACTITIONER

## 2024-12-11 RX ORDER — LISINOPRIL 40 MG/1
40 TABLET ORAL DAILY
Qty: 90 TABLET | Refills: 3 | Status: SHIPPED | OUTPATIENT
Start: 2024-12-11 | End: 2025-12-11

## 2024-12-11 RX ORDER — SPIRONOLACTONE 25 MG/1
25 TABLET ORAL DAILY
Qty: 30 TABLET | Refills: 11 | Status: SHIPPED | OUTPATIENT
Start: 2024-12-11 | End: 2025-12-11

## 2024-12-11 RX ORDER — METOPROLOL SUCCINATE 25 MG/1
25 TABLET, EXTENDED RELEASE ORAL DAILY
Qty: 90 TABLET | Refills: 3 | Status: SHIPPED | OUTPATIENT
Start: 2024-12-11 | End: 2025-12-11

## 2024-12-11 NOTE — PROGRESS NOTES
PCP: Paula, Primary Doctor    Referring Provider:     Subjective:   Cathryn North is a 43 y.o. female with hx of DM2 (uncontrolled) who presents for hospital discharge follow up.     Patient was recently hospitalized here at [facility], presented with chest pain.  Found to have hypertensive urgency.  Patient underwent LHC which revealed nonobstructive CAD with 50% stenosis to distal RCA.  Echo showed EF of 50-55%.  She was discharged on aspirin, Lipitor, lisinopril and Toprol.    Today, patient's blood pressure is elevated at 170/120.  She is asymptomatic.  She states she took her medicine earlier this morning.  She also has complaint of BLE edema and shortness of breath with exertion.        Fhx:  None  Shx:  Never smoker, no EtOH or drug use    EKG   Results for orders placed or performed during the hospital encounter of 11/21/24   EKG 12-lead    Collection Time: 11/21/24  3:44 PM   Result Value Ref Range    QRS Duration 80 ms    [facility] QTC Calculation 427 ms    Narrative    Test Reason : R07.9,    Vent. Rate : 106 BPM     Atrial Rate :    BPM     P-R Int : 124 ms          QRS Dur :  80 ms      QT Int : 346 ms       P-R-T Axes :  65  65 -62 degrees    QTcB Int : 427 ms    Sinus tachycardia  Widespread ST-T abnormality may be due to myocardial ischemia  Abnormal ECG    Confirmed by Monae Underwood ([id number]) on 11/22/2024 11:22:40 AM    Referred By: [email or web address] SELF           Confirmed By: Monae Underwood     ECHO Results for orders placed during the hospital encounter of 11/21/24    Echo    Interpretation Summary    Left Ventricle: The left ventricle is normal in size. Increased wall thickness. There is mild concentric hypertrophy. There is low normal systolic function with a visually estimated ejection fraction of 50 - 55%. There is diastolic dysfunction but grade cannot be determined.    Right Ventricle: Normal right ventricular cavity size. Systolic function is normal.    Left Atrium: Left atrium is mildly dilated.     Pulmonary Artery: Pulmonary artery pressure could not be accurately determined.    IVC/SVC: Normal venous pressure at 3 mmHg.    [facility] Results for orders placed during the hospital encounter of 11/21/24    Cardiac catheterization    Conclusion    The Dist RCA lesion was 50% stenosed.    The ejection fraction was calculated to be 55%.    The pre-procedure left ventricular end diastolic pressure was 14.    The estimated blood loss was none.    There was non-obstructive coronary artery disease..    The procedure log was documented by Documenter: Constantino August; Nevaeh Jhaveri RN and verified by Zion Valenzuela DO.    Date: 11/22/2024  Time: 11:45 AM    Normal LV systolic function, EF 55%  Mild non-obstuctive CAD,    Maximize medical management, risk factor modification.        Lab Results   Component Value Date     (L) 11/23/2024    K 3.6 11/23/2024     11/23/2024    CO2 25 11/23/2024    BUN 11 11/23/2024    CREATININE 0.66 11/23/2024    CALCIUM 8.0 (L) 11/23/2024    ANIONGAP 7 11/23/2024    ESTGFRAFRICA 101 08/19/2021       Lab Results   Component Value Date    CHOL 173 11/21/2024    CHOL 196 04/24/2024     Lab Results   Component Value Date    HDL 39 11/21/2024    HDL 41 04/24/2024     Lab Results   Component Value Date    LDLCALC 109 11/21/2024    LDLCALC 131 04/24/2024     Lab Results   Component Value Date    TRIG 125 11/21/2024    TRIG 119 04/24/2024     Lab Results   Component Value Date    CHOLHDL 4.4 11/21/2024    CHOLHDL 4.8 04/24/2024       Lab Results   Component Value Date    WBC 10.89 11/22/2024    HGB 10.2 (L) 11/22/2024    HCT 32.8 (L) 11/22/2024    MCV 82.4 11/22/2024     11/22/2024           Current Outpatient Medications:     ACCU-CHEK GUIDE GLUCOSE METER [facility], use as directed, Disp: , Rfl:     ACCU-CHEK GUIDE TEST STRIPS Strp, 1 strip by Misc.(Non-Drug; Combo Route) route 2 (two) times a day., Disp: 200 each, Rfl: 2    aspirin (ECOTRIN) 81 MG EC tablet, Take 1 tablet (81 mg  "total) by mouth once daily., Disp: 30 tablet, Rfl: 1    atorvastatin (LIPITOR) 40 MG tablet, Take 1 tablet (40 mg total) by mouth once daily., Disp: 90 tablet, Rfl: 0    insulin glargine U-100, Lantus, (LANTUS U-100 INSULIN) 100 unit/mL injection, Inject 100 Units into the skin every evening. (Patient taking differently: Inject 120 Units into the skin every evening.), Disp: 10 mL, Rfl: 2    lisinopriL (PRINIVIL,ZESTRIL) 40 MG tablet, Take 1 tablet (40 mg total) by mouth once daily., Disp: 90 tablet, Rfl: 3    metFORMIN (GLUCOPHAGE) 1000 MG tablet, Take 1 tablet (1,000 mg total) by mouth 2 (two) times daily., Disp: 90 tablet, Rfl: 1    metoprolol succinate (TOPROL-XL) 25 MG 24 hr tablet, Take 1 tablet (25 mg total) by mouth once daily., Disp: 90 tablet, Rfl: 3    spironolactone (ALDACTONE) 25 MG tablet, Take 1 tablet (25 mg total) by mouth once daily., Disp: 30 tablet, Rfl: 11    tirzepatide (MOUNJARO) 2.5 mg/0.5 mL PnIj, Inject 2.5 mg into the skin every 7 days., Disp: 2 mL, Rfl: 0    Review of Systems   Constitutional:  Negative for chills, diaphoresis, fever and malaise/fatigue.   Respiratory:  Positive for shortness of breath. Negative for cough.    Cardiovascular:  Positive for leg swelling. Negative for chest pain, palpitations, orthopnea and PND.   Gastrointestinal:  Negative for abdominal pain, nausea and vomiting.   Musculoskeletal:  Negative for falls.   Neurological:  Negative for focal weakness and weakness.         Objective:   BP (!) 170/120 (BP Location: Left arm, Patient Position: Sitting)   Pulse 94   Ht 5' 4" (1.626 m)   Wt 99.3 kg (219 lb)   SpO2 98%   BMI 37.59 kg/m²     Physical Exam  Constitutional:       General: She is not in acute distress.     Appearance: Normal appearance. She is obese.   Cardiovascular:      Rate and Rhythm: Normal rate and regular rhythm.   Pulmonary:      Effort: Pulmonary effort is normal.      Breath sounds: Normal breath sounds.   Musculoskeletal:      Cervical " back: Neck supple. No rigidity.      Right lower leg: Edema present.      Left lower leg: Edema present.   Skin:     General: Skin is warm and dry.   Neurological:      Mental Status: She is alert.           Assessment:     1. Hypertension, unspecified type  Comprehensive Metabolic Panel    NT-Pro Natriuretic Peptide    EKG 12-lead    EKG 12-lead      2. Nonocclusive coronary atherosclerosis of native coronary artery  Comprehensive Metabolic Panel    NT-Pro Natriuretic Peptide      3. Primary hypertension              Plan:   Primary hypertension  Increase lisinopril from 20 mg to 40 mg daily  Increase Toprol-XL from 12.5 mg to 25 mg daily  Start Aldactone 25 mg daily  Follow-up in 2 weeks for BP check with nurse and labs    Nonocclusive coronary atherosclerosis of native coronary artery  LHC showed nonobstructive CAD - 50% stenosis to distal RCA  Continue ASA and Lipitor    Follow-up with Dr. Valenzuela in 1 month

## 2024-12-11 NOTE — PATIENT INSTRUCTIONS
Start spironolactone 25mg once a day  Increase lisinopril to 40mg once a day  Increase metoprolol to 25mg once a day  Follow up in 2 weeks for blood pressure check with the nurse and labs  Follow up with Dr. Valenzuela in one month

## 2024-12-11 NOTE — ASSESSMENT & PLAN NOTE
Increase lisinopril from 20 mg to 40 mg daily  Increase Toprol-XL from 12.5 mg to 25 mg daily  Start Aldactone 25 mg daily  Follow-up in 2 weeks for BP check with nurse and labs

## 2025-02-25 RX ORDER — LISINOPRIL 40 MG/1
40 TABLET ORAL DAILY
Qty: 90 TABLET | Refills: 3 | OUTPATIENT
Start: 2025-02-25 | End: 2026-02-25

## 2025-02-25 RX ORDER — METOPROLOL SUCCINATE 25 MG/1
25 TABLET, EXTENDED RELEASE ORAL DAILY
Qty: 90 TABLET | Refills: 3 | OUTPATIENT
Start: 2025-02-25 | End: 2026-02-25

## 2025-02-25 RX ORDER — SPIRONOLACTONE 25 MG/1
25 TABLET ORAL DAILY
Qty: 30 TABLET | Refills: 11 | OUTPATIENT
Start: 2025-02-25 | End: 2026-02-25

## 2025-02-25 NOTE — TELEPHONE ENCOUNTER
Called patient back concerning appointment and prescriptions.Per [place] patient needs to  seen by Dr Valenzuela and prescriptions are at [facility]

## 2025-08-25 ENCOUNTER — HOSPITAL ENCOUNTER (EMERGENCY)
Facility: HOSPITAL | Age: 45
Discharge: HOME | End: 2025-08-25
Attending: EMERGENCY MEDICINE
Payer: COMMERCIAL

## 2025-08-25 VITALS
TEMPERATURE: 98 F | OXYGEN SATURATION: 93 % | SYSTOLIC BLOOD PRESSURE: 169 MMHG | RESPIRATION RATE: 17 BRPM | HEART RATE: 84 BPM | HEIGHT: 63 IN | DIASTOLIC BLOOD PRESSURE: 74 MMHG | BODY MASS INDEX: 38.09 KG/M2 | WEIGHT: 215 LBS

## 2025-08-25 DIAGNOSIS — R07.9 CHEST PAIN: Primary | ICD-10-CM

## 2025-08-25 LAB
ALBUMIN SERPL BCP-MCNC: 2.4 G/DL (ref 3.5–5)
ALBUMIN/GLOB SERPL: 0.5 {RATIO}
ALP SERPL-CCNC: 75 U/L (ref 40–150)
ALT SERPL W P-5'-P-CCNC: <7 U/L
ANION GAP SERPL CALCULATED.3IONS-SCNC: 12 MMOL/L (ref 7–16)
AST SERPL W P-5'-P-CCNC: 12 U/L (ref 11–45)
BASOPHILS # BLD AUTO: 0.03 K/UL (ref 0–0.2)
BASOPHILS NFR BLD AUTO: 0.4 % (ref 0–1)
BILIRUB SERPL-MCNC: 0.3 MG/DL
BUN SERPL-MCNC: 15 MG/DL (ref 7–19)
BUN/CREAT SERPL: 15 (ref 6–20)
CALCIUM SERPL-MCNC: 8.9 MG/DL (ref 8.4–10.2)
CHLORIDE SERPL-SCNC: 102 MMOL/L (ref 98–107)
CO2 SERPL-SCNC: 22 MMOL/L (ref 22–29)
CREAT SERPL-MCNC: 1 MG/DL (ref 0.55–1.02)
DIFFERENTIAL METHOD BLD: ABNORMAL
EGFR (NO RACE VARIABLE) (RUSH/TITUS): 71 ML/MIN/1.73M2
EOSINOPHIL # BLD AUTO: 0.08 K/UL (ref 0–0.5)
EOSINOPHIL NFR BLD AUTO: 1 % (ref 1–4)
ERYTHROCYTE [DISTWIDTH] IN BLOOD BY AUTOMATED COUNT: 13.8 % (ref 11.5–14.5)
GLOBULIN SER-MCNC: 5.1 G/DL (ref 2–4)
GLUCOSE SERPL-MCNC: 544 MG/DL (ref 74–100)
HCT VFR BLD AUTO: 38.5 % (ref 38–47)
HGB BLD-MCNC: 11.9 G/DL (ref 12–16)
IMM GRANULOCYTES # BLD AUTO: 0.02 K/UL (ref 0–0.04)
IMM GRANULOCYTES NFR BLD: 0.3 % (ref 0–0.4)
LYMPHOCYTES # BLD AUTO: 2.57 K/UL (ref 1–4.8)
LYMPHOCYTES NFR BLD AUTO: 33.3 % (ref 27–41)
MCH RBC QN AUTO: 25.4 PG (ref 27–31)
MCHC RBC AUTO-ENTMCNC: 30.9 G/DL (ref 32–36)
MCV RBC AUTO: 82.1 FL (ref 80–96)
MONOCYTES # BLD AUTO: 0.44 K/UL (ref 0–0.8)
MONOCYTES NFR BLD AUTO: 5.7 % (ref 2–6)
MPC BLD CALC-MCNC: 10.8 FL (ref 9.4–12.4)
NEUTROPHILS # BLD AUTO: 4.58 K/UL (ref 1.8–7.7)
NEUTROPHILS NFR BLD AUTO: 59.3 % (ref 53–65)
NRBC # BLD AUTO: 0 X10E3/UL
NRBC, AUTO (.00): 0 %
NT-PROBNP SERPL-MCNC: 23 PG/ML (ref 1–125)
PLATELET # BLD AUTO: 380 K/UL (ref 150–400)
POCT GLUCOSE: 358 MG/DL (ref 70–110)
POTASSIUM SERPL-SCNC: 4 MMOL/L (ref 3.5–5.1)
PROT SERPL-MCNC: 7.5 G/DL (ref 6.4–8.3)
RBC # BLD AUTO: 4.69 M/UL (ref 4.2–5.4)
SODIUM SERPL-SCNC: 132 MMOL/L (ref 136–145)
TROPONIN I SERPL HS-MCNC: <2.7 NG/L
TROPONIN I SERPL HS-MCNC: <2.7 NG/L
WBC # BLD AUTO: 7.72 K/UL (ref 4.5–11)

## 2025-08-25 PROCEDURE — 80053 COMPREHEN METABOLIC PANEL: CPT | Performed by: EMERGENCY MEDICINE

## 2025-08-25 PROCEDURE — 96361 HYDRATE IV INFUSION ADD-ON: CPT

## 2025-08-25 PROCEDURE — 83880 ASSAY OF NATRIURETIC PEPTIDE: CPT | Performed by: EMERGENCY MEDICINE

## 2025-08-25 PROCEDURE — 99285 EMERGENCY DEPT VISIT HI MDM: CPT | Mod: 25

## 2025-08-25 PROCEDURE — 82962 GLUCOSE BLOOD TEST: CPT

## 2025-08-25 PROCEDURE — 36415 COLL VENOUS BLD VENIPUNCTURE: CPT | Performed by: EMERGENCY MEDICINE

## 2025-08-25 PROCEDURE — 93010 ELECTROCARDIOGRAM REPORT: CPT | Mod: ,,, | Performed by: INTERNAL MEDICINE

## 2025-08-25 PROCEDURE — 85025 COMPLETE CBC W/AUTO DIFF WBC: CPT | Performed by: EMERGENCY MEDICINE

## 2025-08-25 PROCEDURE — 96360 HYDRATION IV INFUSION INIT: CPT

## 2025-08-25 PROCEDURE — 25000003 PHARM REV CODE 250: Performed by: EMERGENCY MEDICINE

## 2025-08-25 PROCEDURE — 93005 ELECTROCARDIOGRAM TRACING: CPT

## 2025-08-25 PROCEDURE — 84484 ASSAY OF TROPONIN QUANT: CPT | Performed by: EMERGENCY MEDICINE

## 2025-08-25 RX ORDER — ASPIRIN 325 MG
325 TABLET ORAL
Status: COMPLETED | OUTPATIENT
Start: 2025-08-25 | End: 2025-08-25

## 2025-08-25 RX ADMIN — SODIUM CHLORIDE 1000 ML: 9 INJECTION, SOLUTION INTRAVENOUS at 09:08

## 2025-08-25 RX ADMIN — ASPIRIN 325 MG ORAL TABLET 325 MG: 325 PILL ORAL at 07:08

## 2025-08-26 LAB
OHS QRS DURATION: 80 MS
OHS QTC CALCULATION: 466 MS

## (undated) DEVICE — CONTRAST ISOVUE 370 100ML

## (undated) DEVICE — SHEATH INTRODUCER 6FR 11CM

## (undated) DEVICE — Device

## (undated) DEVICE — SET IV PRIMARY

## (undated) DEVICE — COVER PROBE US GEL BAND

## (undated) DEVICE — PROTECTOR ULNAR NERVE FOAM

## (undated) DEVICE — INTRODUCER KIT MICRO 4FR

## (undated) DEVICE — CLIPPER BLADE MOD 4406 (CAREF)

## (undated) DEVICE — CHLORAPREP 10.5 ML APPLICATOR

## (undated) DEVICE — CATH DIAG IMPULSE 6FR FR4

## (undated) DEVICE — ETCO2 NC MICROSTR FEM ST ADLT

## (undated) DEVICE — GLOVE SENSICARE PI SURG 8

## (undated) DEVICE — CATH DIAG IMPULSE 6FR FL4

## (undated) DEVICE — GLOVE SENSICARE PI SURG 7

## (undated) DEVICE — OXISENSOR ADULT DIGIT N/S

## (undated) DEVICE — DECANTER FLUID TRNSF WHITE 9IN

## (undated) DEVICE — DRESSING TRANS 4X4 TEGADERM

## (undated) DEVICE — SET FLD DEL LG BORE SPIK 72IN

## (undated) DEVICE — SET EXT IV LL MALE 2 VLVE 20IN